# Patient Record
Sex: FEMALE | Race: BLACK OR AFRICAN AMERICAN | NOT HISPANIC OR LATINO | URBAN - METROPOLITAN AREA
[De-identification: names, ages, dates, MRNs, and addresses within clinical notes are randomized per-mention and may not be internally consistent; named-entity substitution may affect disease eponyms.]

---

## 2017-06-16 ENCOUNTER — INPATIENT (INPATIENT)
Facility: HOSPITAL | Age: 83
LOS: 1 days | Discharge: HOME | End: 2017-06-18
Attending: INTERNAL MEDICINE | Admitting: INTERNAL MEDICINE
Payer: MEDICARE

## 2017-06-16 VITALS
HEART RATE: 83 BPM | TEMPERATURE: 99 F | SYSTOLIC BLOOD PRESSURE: 151 MMHG | WEIGHT: 130.07 LBS | RESPIRATION RATE: 18 BRPM | HEIGHT: 67 IN | OXYGEN SATURATION: 97 % | DIASTOLIC BLOOD PRESSURE: 86 MMHG

## 2017-06-16 DIAGNOSIS — I10 ESSENTIAL (PRIMARY) HYPERTENSION: ICD-10-CM

## 2017-06-16 DIAGNOSIS — G30.9 ALZHEIMER'S DISEASE, UNSPECIFIED: ICD-10-CM

## 2017-06-16 DIAGNOSIS — T78.3XXA ANGIONEUROTIC EDEMA, INITIAL ENCOUNTER: ICD-10-CM

## 2017-06-16 DIAGNOSIS — E11.9 TYPE 2 DIABETES MELLITUS WITHOUT COMPLICATIONS: ICD-10-CM

## 2017-06-16 LAB
ALBUMIN SERPL ELPH-MCNC: 3.6 G/DL — SIGNIFICANT CHANGE UP (ref 3.3–5)
ALP SERPL-CCNC: 70 U/L — SIGNIFICANT CHANGE UP (ref 40–120)
ALT FLD-CCNC: 18 U/L — SIGNIFICANT CHANGE UP (ref 12–78)
ANION GAP SERPL CALC-SCNC: 9 MMOL/L — SIGNIFICANT CHANGE UP (ref 5–17)
APTT BLD: 31.3 SEC — SIGNIFICANT CHANGE UP (ref 27.5–37.4)
AST SERPL-CCNC: 23 U/L — SIGNIFICANT CHANGE UP (ref 15–37)
BASOPHILS # BLD AUTO: 0.1 K/UL — SIGNIFICANT CHANGE UP (ref 0–0.2)
BASOPHILS NFR BLD AUTO: 2 % — SIGNIFICANT CHANGE UP (ref 0–2)
BILIRUB SERPL-MCNC: 0.5 MG/DL — SIGNIFICANT CHANGE UP (ref 0.2–1.2)
BLD GP AB SCN SERPL QL: SIGNIFICANT CHANGE UP
BUN SERPL-MCNC: 9 MG/DL — SIGNIFICANT CHANGE UP (ref 7–23)
CALCIUM SERPL-MCNC: 9.2 MG/DL — SIGNIFICANT CHANGE UP (ref 8.5–10.1)
CHLORIDE SERPL-SCNC: 108 MMOL/L — SIGNIFICANT CHANGE UP (ref 96–108)
CO2 SERPL-SCNC: 27 MMOL/L — SIGNIFICANT CHANGE UP (ref 22–31)
CREAT SERPL-MCNC: 1.03 MG/DL — SIGNIFICANT CHANGE UP (ref 0.5–1.3)
EOSINOPHIL # BLD AUTO: 0.2 K/UL — SIGNIFICANT CHANGE UP (ref 0–0.5)
EOSINOPHIL NFR BLD AUTO: 3.5 % — SIGNIFICANT CHANGE UP (ref 0–6)
GLUCOSE SERPL-MCNC: 91 MG/DL — SIGNIFICANT CHANGE UP (ref 70–99)
HCT VFR BLD CALC: 39.8 % — SIGNIFICANT CHANGE UP (ref 34.5–45)
HGB BLD-MCNC: 13.6 G/DL — SIGNIFICANT CHANGE UP (ref 11.5–15.5)
INR BLD: 1.05 RATIO — SIGNIFICANT CHANGE UP (ref 0.88–1.16)
LYMPHOCYTES # BLD AUTO: 1.4 K/UL — SIGNIFICANT CHANGE UP (ref 1–3.3)
LYMPHOCYTES # BLD AUTO: 22.5 % — SIGNIFICANT CHANGE UP (ref 13–44)
MCHC RBC-ENTMCNC: 28.1 PG — SIGNIFICANT CHANGE UP (ref 27–34)
MCHC RBC-ENTMCNC: 34.1 GM/DL — SIGNIFICANT CHANGE UP (ref 32–36)
MCV RBC AUTO: 82.5 FL — SIGNIFICANT CHANGE UP (ref 80–100)
MONOCYTES # BLD AUTO: 0.5 K/UL — SIGNIFICANT CHANGE UP (ref 0–0.9)
MONOCYTES NFR BLD AUTO: 8.7 % — SIGNIFICANT CHANGE UP (ref 2–14)
NEUTROPHILS # BLD AUTO: 3.8 K/UL — SIGNIFICANT CHANGE UP (ref 1.8–7.4)
NEUTROPHILS NFR BLD AUTO: 63.3 % — SIGNIFICANT CHANGE UP (ref 43–77)
PLATELET # BLD AUTO: 217 K/UL — SIGNIFICANT CHANGE UP (ref 150–400)
POTASSIUM SERPL-MCNC: 4.1 MMOL/L — SIGNIFICANT CHANGE UP (ref 3.5–5.3)
POTASSIUM SERPL-SCNC: 4.1 MMOL/L — SIGNIFICANT CHANGE UP (ref 3.5–5.3)
PROT SERPL-MCNC: 7.7 GM/DL — SIGNIFICANT CHANGE UP (ref 6–8.3)
PROTHROM AB SERPL-ACNC: 11.5 SEC — SIGNIFICANT CHANGE UP (ref 9.8–12.7)
RBC # BLD: 4.82 M/UL — SIGNIFICANT CHANGE UP (ref 3.8–5.2)
RBC # FLD: 12.3 % — SIGNIFICANT CHANGE UP (ref 11–15)
SODIUM SERPL-SCNC: 144 MMOL/L — SIGNIFICANT CHANGE UP (ref 135–145)
WBC # BLD: 6.1 K/UL — SIGNIFICANT CHANGE UP (ref 3.8–10.5)
WBC # FLD AUTO: 6.1 K/UL — SIGNIFICANT CHANGE UP (ref 3.8–10.5)

## 2017-06-16 PROCEDURE — 99291 CRITICAL CARE FIRST HOUR: CPT

## 2017-06-16 RX ORDER — FAMOTIDINE 10 MG/ML
20 INJECTION INTRAVENOUS
Qty: 0 | Refills: 0 | Status: DISCONTINUED | OUTPATIENT
Start: 2017-06-16 | End: 2017-06-16

## 2017-06-16 RX ORDER — DIPHENHYDRAMINE HCL 50 MG
25 CAPSULE ORAL ONCE
Qty: 0 | Refills: 0 | Status: COMPLETED | OUTPATIENT
Start: 2017-06-16 | End: 2017-06-16

## 2017-06-16 RX ORDER — HALOPERIDOL DECANOATE 100 MG/ML
5 INJECTION INTRAMUSCULAR ONCE
Qty: 0 | Refills: 0 | Status: COMPLETED | OUTPATIENT
Start: 2017-06-16 | End: 2017-06-16

## 2017-06-16 RX ORDER — ENOXAPARIN SODIUM 100 MG/ML
40 INJECTION SUBCUTANEOUS EVERY 24 HOURS
Qty: 0 | Refills: 0 | Status: DISCONTINUED | OUTPATIENT
Start: 2017-06-16 | End: 2017-06-18

## 2017-06-16 RX ORDER — FAMOTIDINE 10 MG/ML
40 INJECTION INTRAVENOUS ONCE
Qty: 0 | Refills: 0 | Status: COMPLETED | OUTPATIENT
Start: 2017-06-16 | End: 2017-06-16

## 2017-06-16 RX ORDER — SODIUM CHLORIDE 9 MG/ML
1000 INJECTION INTRAMUSCULAR; INTRAVENOUS; SUBCUTANEOUS ONCE
Qty: 0 | Refills: 0 | Status: COMPLETED | OUTPATIENT
Start: 2017-06-16 | End: 2017-06-16

## 2017-06-16 RX ORDER — DIPHENHYDRAMINE HCL 50 MG
50 CAPSULE ORAL EVERY 12 HOURS
Qty: 0 | Refills: 0 | Status: DISCONTINUED | OUTPATIENT
Start: 2017-06-16 | End: 2017-06-18

## 2017-06-16 RX ORDER — FAMOTIDINE 10 MG/ML
20 INJECTION INTRAVENOUS DAILY
Qty: 0 | Refills: 0 | Status: DISCONTINUED | OUTPATIENT
Start: 2017-06-16 | End: 2017-06-18

## 2017-06-16 RX ADMIN — Medication 25 MILLIGRAM(S): at 08:30

## 2017-06-16 RX ADMIN — Medication 50 MILLIGRAM(S): at 18:23

## 2017-06-16 RX ADMIN — FAMOTIDINE 40 MILLIGRAM(S): 10 INJECTION INTRAVENOUS at 08:30

## 2017-06-16 RX ADMIN — HALOPERIDOL DECANOATE 5 MILLIGRAM(S): 100 INJECTION INTRAMUSCULAR at 19:44

## 2017-06-16 RX ADMIN — Medication 40 MILLIGRAM(S): at 18:23

## 2017-06-16 RX ADMIN — Medication 125 MILLIGRAM(S): at 08:30

## 2017-06-16 RX ADMIN — SODIUM CHLORIDE 1000 MILLILITER(S): 9 INJECTION INTRAMUSCULAR; INTRAVENOUS; SUBCUTANEOUS at 08:36

## 2017-06-16 RX ADMIN — ENOXAPARIN SODIUM 40 MILLIGRAM(S): 100 INJECTION SUBCUTANEOUS at 18:22

## 2017-06-16 RX ADMIN — Medication 40 MILLIGRAM(S): at 23:05

## 2017-06-16 NOTE — ED PROVIDER NOTE - MEDICAL DECISION MAKING DETAILS
Patient presents with angioedema.  progress of edema abated but now crossing midline.  d/w ICu and will admit. for observation. Andrew Kaur to evaluate.  VSS.  Uneventful ED observation period. Non toxic.

## 2017-06-16 NOTE — ED ADULT NURSE NOTE - OBJECTIVE STATEMENT
patient received, alert and oriented x4, came here for throat swelling and itchiness noticed by patient this morning when she woke up. patient noted with hoarse voice and throat clearing. no rash noted. no resp distress noted.

## 2017-06-16 NOTE — H&P ADULT - NSHPPHYSICALEXAM_GEN_ALL_CORE
PHYSICAL EXAM:    GENERAL: NAD, well-groomed, well-developed  HEAD:  Atraumatic, Normocephalic  EYES: EOMI, PERRLA, conjunctiva and sclera clear  ENMT: No tonsillar erythema, exudates, or enlargement; Moist mucous membranes,+ bilat lip swelling. tongue large Unable to see uvula phonation hoarse   NECK: Supple, No JVD, Normal thyroid  NERVOUS SYSTEM:  Alert & Oriented X3, Good concentration; Motor Strength 5/5 B/L upper and lower extremities; DTRs 2+ intact and symmetric  CHEST/LUNG: Clear to percussion bilaterally; No rales, rhonchi, wheezing, or rubs  HEART: Regular rate and rhythm; No murmurs, rubs, or gallops  ABDOMEN: Soft, Nontender, Nondistended; Bowel sounds present  EXTREMITIES:  2+ Peripheral Pulses, No clubbing, cyanosis, or edema  LYMPH: No lymphadenopathy noted  SKIN: No rashes or lesions GENERAL: NAD, well-groomed, well-developed  HEAD:  Atraumatic, Normocephalic  EYES: EOMI, PERRLA, conjunctiva and sclera clear  ENMT: No tonsillar erythema, exudates, or enlargement; Moist mucous membranes,+ bilat lip swelling. tongue large Unable to see uvula phonation hoarse   NECK: Supple, No JVD, Normal thyroid  NERVOUS SYSTEM:  Alert & Oriented X3, Good concentration; Motor Strength 5/5 B/L upper and lower extremities; DTRs 2+ intact and symmetric  CHEST/LUNG: Clear to percussion bilaterally; No rales, rhonchi, wheezing, or rubs  HEART: Regular rate and rhythm; No murmurs, rubs, or gallops  ABDOMEN: Soft, Nontender, Nondistended; Bowel sounds present  EXTREMITIES:  2+ Peripheral Pulses, No clubbing, cyanosis, or edema  LYMPH: No lymphadenopathy noted  SKIN: No rashes or lesions

## 2017-06-16 NOTE — ED PROVIDER NOTE - OBJECTIVE STATEMENT
Pertinent PMH/PSH/FHx/SHx and Review of Systems contained within:  Patient presents to the ED for lip and throat edema.  PMH of reported NSAID allergy but takes ASA daily.  PMH of HTN, HLD.  Patient with Sx from waking today.  lower lip and "feels like my throat is thick."  normal phonation.  no distress.  normal neck flexion.  comforatable supine in stretcher.  occurred today only.  took theraflu yesterday.  no skin/lung manifestations.  does take ACE-I for HTN.  ???similar occurrence years ago.  daughter had to stop ACE due to cough issues.  Non toxic.  Well appearing. No aggravating or relieving factors. No other pertinent PMH.  No other pertinent PSH.  No other pertinent FHx.  Patient denies EtOH/tobacco/illicit substance use. No fever/chills, No photophobia/eye pain/changes in vision, No ear pain/sore throat/dysphagia, No chest pain/palpitations, no SOB/cough/wheeze/stridor, No abdominal pain, No N/V/D, no dysuria/frequency/discharge, No neck/back pain, no rash, no changes in neurological status/function.

## 2017-06-16 NOTE — H&P ADULT - NSHPLABSRESULTS_GEN_ALL_CORE
CBC Full  -  ( 16 Jun 2017 08:44 )  WBC Count : 6.1 K/uL  Hemoglobin : 13.6 g/dL  Hematocrit : 39.8 %  Platelet Count - Automated : 217 K/uL  Mean Cell Volume : 82.5 fl  Mean Cell Hemoglobin : 28.1 pg  Mean Cell Hemoglobin Concentration : 34.1 gm/dL  Auto Neutrophil # : 3.8 K/uL  Auto Lymphocyte # : 1.4 K/uL  Auto Monocyte # : 0.5 K/uL  Auto Eosinophil # : 0.2 K/uL  Auto Basophil # : 0.1 K/uL  Auto Neutrophil % : 63.3 %  Auto Lymphocyte % : 22.5 %  Auto Monocyte % : 8.7 %  Auto Eosinophil % : 3.5 %  Auto Basophil % : 2.0 %      06-16    144  |  108  |  9   ----------------------------<  91  4.1   |  27  |  1.03    Ca    9.2      16 Jun 2017 08:44    TPro  7.7  /  Alb  3.6  /  TBili  0.5  /  DBili  x   /  AST  23  /  ALT  18  /  AlkPhos  70  06-16    LIVER FUNCTIONS - ( 16 Jun 2017 08:44 )  Alb: 3.6 g/dL / Pro: 7.7 gm/dL / ALK PHOS: 70 U/L / ALT: 18 U/L / AST: 23 U/L / GGT: x           CAPILLARY BLOOD GLUCOSE    PT/INR - ( 16 Jun 2017 08:44 )   PT: 11.5 sec;   INR: 1.05 ratio         PTT - ( 16 Jun 2017 08:44 )  PTT:31.3 sec    No chest x ray done will order  No ekg done - will order

## 2017-06-16 NOTE — ED PROVIDER NOTE - PHYSICAL EXAMINATION
Gen: Alert, NAD Head: NC, AT, PERRL, EOMI, normal lids/conjunctiva ENT:R>>L lower lip bullous edema crossing midline affecting ~75% of lip, mild right side upper lip edema ~25%, normal hearing, patent oropharynx without erythema/exudate, base of uvular visible and uvula midline Neck: +supple, no tenderness/meningismus/JVD, +Trachea midline Pulm: Bilateral BS, normal resp effort, no wheeze/stridor/retractions CV: RRR, no M/R/G, +dist pulses Abd: soft, NT/ND, +BS, no hepatosplenomegaly Mskel: no edema/erythema/cyanosis Skin: no rash Neuro: AAOx3, no sensory/motor deficits, CN 2-12 intact

## 2017-06-16 NOTE — ED PROVIDER NOTE - CRITICAL CARE PROVIDED
additional history taking/documentation/consultation with other physicians/interpretation of diagnostic studies/telephone consultation with the patient's family/direct patient care (not related to procedure)/consult w/ pt's family directly relating to pts condition

## 2017-06-16 NOTE — H&P ADULT - NSHPREVIEWOFSYSTEMS_GEN_ALL_CORE
CONSTITUTIONAL: No fever, weight loss, or fatigue  EYES: No eye pain, visual disturbances, or discharge  ENMT:  No difficulty hearing, tinnitus, vertigo; + throat pain  + lip swelling   NECK: No pain or stiffness  RESPIRATORY: No cough, wheezing, chills or hemoptysis; No shortness of breath  CARDIOVASCULAR: No chest pain, palpitations, dizziness, or leg swelling  GASTROINTESTINAL: No abdominal or epigastric pain. No nausea, vomiting, or hematemesis; No diarrhea or constipation. No melena or hematochezia.  GENITOURINARY: No dysuria, frequency, hematuria, or incontinence  NEUROLOGICAL: No headaches, memory loss, loss of strength, numbness, or tremors  SKIN: No itching, burning, rashes, or lesions   LYMPH NODES: No enlarged glands  ENDOCRINE: No heat or cold intolerance; No hair loss  MUSCULOSKELETAL: No joint pain or swelling; No muscle, back, or extremity pain  PSYCHIATRIC: No depression, anxiety, mood swings, or difficulty sleeping  HEME/LYMPH: No easy bruising, or bleeding gums  ALLERGY AND IMMUNOLOGIC: No hives or eczema

## 2017-06-16 NOTE — H&P ADULT - PROBLEM SELECTOR PLAN 1
Admit to ICU,  Steroids, H2 blocker, Benadryl ATC, if edema worsens FFP, ENT consult called Dr. Kaur to see patient. Chest x ray and EKG follow up labs

## 2017-06-16 NOTE — CONSULT NOTE ADULT - SUBJECTIVE AND OBJECTIVE BOX
HPI:  82 y/o  female presents to the ED for lip and throat edema.  PMH of reported NSAID allergy but takes ASA daily.  PMH of HTN, HLD, DM - .  Patient with Sx from waking today.  lower lip and "feels like my throat is thick."  normal phonation. No distress,  normal neck flexion, comfortable supine in stretcher. Patient symptoms statrted today Patient reported  took theraflu yesterday.  Patient reports does take ACE-I for HTN.  ??? similar occurrence years ago.  Daughter had to stop ACE due to cough issues. ICU called for evaluation, Patient seen and examined at bedside appears non toxic (16 Jun 2017 17:57). Howerr at 21:24 patient is confused. ./ dementia      PAST MEDICAL & SURGICAL HISTORY:  Alzheimer disease  DM (diabetes mellitus)  HTN (hypertension)  No significant past surgical history      REVIEW OF SYSTEMS:    CONSTITUTIONAL: No fever, weight loss, or fatigue  EYES: No eye pain, visual disturbances, or discharge  ENMT:  No difficulty hearing, tinnitus, vertigo; No sinus or throat pain  NECK: No pain or stiffness  BREASTS: No pain, masses, or nipple discharge  RESPIRATORY: No cough, wheezing, chills or hemoptysis; No shortness of breath  CARDIOVASCULAR: No chest pain, palpitations, dizziness, or leg swelling  GASTROINTESTINAL: No abdominal or epigastric pain. No nausea, vomiting, or hematemesis; No diarrhea or constipation. No melena or hematochezia.  GENITOURINARY: No dysuria, frequency, hematuria, or incontinence  NEUROLOGICAL: No headaches, memory loss, loss of strength, numbness, or tremors  SKIN: No itching, burning, rashes, or lesions   LYMPH NODES: No enlarged glands  ENDOCRINE: No heat or cold intolerance; No hair loss  MUSCULOSKELETAL: No joint pain or swelling; No muscle, back, or extremity pain  PSYCHIATRIC: No depression, anxiety, mood swings, or difficulty sleeping  HEME/LYMPH: No easy bruising, or bleeding gums  ALLERY AND IMMUNOLOGIC: No hives or eczema      MEDICATIONS  (STANDING):  enoxaparin Injectable 40milliGRAM(s) SubCutaneous every 24 hours  methylPREDNISolone sodium succinate Injectable 40milliGRAM(s) IV Push every 6 hours  diphenhydrAMINE   Injectable 50milliGRAM(s) IV Push every 12 hours  famotidine Injectable 20milliGRAM(s) IV Push daily    MEDICATIONS  (PRN):      Allergies    lisinopril (Swelling)    Intolerances        SOCIAL HISTORY:    FAMILY HISTORY:  No pertinent family history in first degree relatives      Vital Signs Last 24 Hrs  T(C): 36.4, Max: 37.6 (06-16 @ 09:10)  T(F): 97.6, Max: 99.6 (06-16 @ 09:10)  HR: 66 (0 - 83)  BP: 156/91 (147/90 - 174/90)  BP(mean): 107 (94 - 111)  RR: 15 (13 - 23)  SpO2: 90% (90% - 100%)    PHYSICAL EXAM:    GENERAL: NAD, well-groomed, well-developed  HEAD:  Atraumatic, Normocephalic. some lip swelling still present.  EYES: EOMI, PERRLA, conjunctiva and sclera clear  ENMT: No tonsillar erythema, exudates, or enlargement; Moist mucous membranes, Good dentition, No lesions  NECK: Supple, No JVD, Normal thyroid  NERVOUS SYSTEM:  confused,; Motor Strength 5/5 B/L upper and lower extremities; DTRs 2+ intact and symmetric  CHEST/LUNG: Clear to percussion bilaterally; No rales, rhonchi, wheezing, or rubs  HEART: Regular rate and rhythm; No murmurs, rubs, or gallops  ABDOMEN: Soft, Nontender, Nondistended; Bowel sounds present  EXTREMITIES:  2+ Peripheral Pulses, No clubbing, cyanosis, or edema  LYMPH: No lymphadenopathy notedRECTAL: No masses, prostate normal size and smooth, Guiac negative   BREAST: No palpable masses, skin no lesions no retractions     SKIN: No rashes or lesions      LABS:                        13.6   6.1   )-----------( 217      ( 16 Jun 2017 08:44 )             39.8     06-16    144  |  108  |  9   ----------------------------<  91  4.1   |  27  |  1.03    Ca    9.2      16 Jun 2017 08:44    TPro  7.7  /  Alb  3.6  /  TBili  0.5  /  DBili  x   /  AST  23  /  ALT  18  /  AlkPhos  70  06-16    PT/INR - ( 16 Jun 2017 08:44 )   PT: 11.5 sec;   INR: 1.05 ratio         PTT - ( 16 Jun 2017 08:44 )  PTT:31.3 sec        RADIOLOGY & ADDITIONAL STUDIES:

## 2017-06-16 NOTE — ED ADULT NURSE REASSESSMENT NOTE - NS ED NURSE REASSESS COMMENT FT1
FFP initiated at 0922 as per md to be stat, explained to patient and daughter to report any signs of reaction like chest pain, sob, itchiness, back pain, or of feeling something that is getting worse. patient verbalized understand. v/s documented on paper and flowsheet. will be monitoring patient for the first 15 mins for any transfusion reaction.
1 unit ffp finished and another unit was initiated, no transfusion reaction noted on patient. v/s taken and stable. patient verbalized understanding to note any signs of blood transfusion reaction like sob, chest pain, flank  pain, back pain, itchiness, difficulty breathing.
15 mins through patient's ffp transfusion, no reaction noted. v/s stable. patient put on 2L ns due to saturation going to 93%-95%
2nd unit of ffp done, v/s taken and stable. no reaction noted.
patient stated she feels better, still swelling on lower lips

## 2017-06-16 NOTE — H&P ADULT - ASSESSMENT
82 y/o  female presents to the ED for lip and throat edema.  PMH of reported NSAID allergy but takes ASA daily.  PMH of HTN, HLD, DM . Will admit for angioedema probably secondary to ACE as d/w Dr. Mixon

## 2017-06-16 NOTE — H&P ADULT - HISTORY OF PRESENT ILLNESS
84 y/o  female presents to the ED for lip and throat edema.  PMH of reported NSAID allergy but takes ASA daily.  PMH of HTN, HLD, DM - .  Patient with Sx from waking today.  lower lip and "feels like my throat is thick."  normal phonation. No distress,  normal neck flexion, comfortable supine in stretcher. Patient symptoms statrted today Patient reported  took theraflu yesterday.  Patient reports does take ACE-I for HTN.  ??? similar occurrence years ago.  Daughter had to stop ACE due to cough issues. ICU called for evaluation, Patient seen and examined at bedside appears non toxic

## 2017-06-17 LAB
ANION GAP SERPL CALC-SCNC: 8 MMOL/L — SIGNIFICANT CHANGE UP (ref 5–17)
BUN SERPL-MCNC: 13 MG/DL — SIGNIFICANT CHANGE UP (ref 7–23)
CALCIUM SERPL-MCNC: 8.7 MG/DL — SIGNIFICANT CHANGE UP (ref 8.5–10.1)
CHLORIDE SERPL-SCNC: 109 MMOL/L — HIGH (ref 96–108)
CO2 SERPL-SCNC: 25 MMOL/L — SIGNIFICANT CHANGE UP (ref 22–31)
CREAT SERPL-MCNC: 0.83 MG/DL — SIGNIFICANT CHANGE UP (ref 0.5–1.3)
GLUCOSE SERPL-MCNC: 127 MG/DL — HIGH (ref 70–99)
HBA1C BLD-MCNC: 5.9 % — HIGH (ref 4–5.6)
HCT VFR BLD CALC: 34 % — LOW (ref 34.5–45)
HGB BLD-MCNC: 11.7 G/DL — SIGNIFICANT CHANGE UP (ref 11.5–15.5)
MAGNESIUM SERPL-MCNC: 2.2 MG/DL — SIGNIFICANT CHANGE UP (ref 1.6–2.6)
MCHC RBC-ENTMCNC: 27.6 PG — SIGNIFICANT CHANGE UP (ref 27–34)
MCHC RBC-ENTMCNC: 34.4 GM/DL — SIGNIFICANT CHANGE UP (ref 32–36)
MCV RBC AUTO: 80.4 FL — SIGNIFICANT CHANGE UP (ref 80–100)
PHOSPHATE SERPL-MCNC: 3.2 MG/DL — SIGNIFICANT CHANGE UP (ref 2.5–4.5)
PLATELET # BLD AUTO: 195 K/UL — SIGNIFICANT CHANGE UP (ref 150–400)
POTASSIUM SERPL-MCNC: 3.8 MMOL/L — SIGNIFICANT CHANGE UP (ref 3.5–5.3)
POTASSIUM SERPL-SCNC: 3.8 MMOL/L — SIGNIFICANT CHANGE UP (ref 3.5–5.3)
RBC # BLD: 4.23 M/UL — SIGNIFICANT CHANGE UP (ref 3.8–5.2)
RBC # FLD: 12.5 % — SIGNIFICANT CHANGE UP (ref 11–15)
SODIUM SERPL-SCNC: 142 MMOL/L — SIGNIFICANT CHANGE UP (ref 135–145)
WBC # BLD: 6.1 K/UL — SIGNIFICANT CHANGE UP (ref 3.8–10.5)
WBC # FLD AUTO: 6.1 K/UL — SIGNIFICANT CHANGE UP (ref 3.8–10.5)

## 2017-06-17 RX ORDER — AMLODIPINE BESYLATE 2.5 MG/1
10 TABLET ORAL DAILY
Qty: 0 | Refills: 0 | Status: DISCONTINUED | OUTPATIENT
Start: 2017-06-18 | End: 2017-06-18

## 2017-06-17 RX ORDER — AMLODIPINE BESYLATE 2.5 MG/1
5 TABLET ORAL ONCE
Qty: 0 | Refills: 0 | Status: COMPLETED | OUTPATIENT
Start: 2017-06-17 | End: 2017-06-17

## 2017-06-17 RX ORDER — HALOPERIDOL DECANOATE 100 MG/ML
5 INJECTION INTRAMUSCULAR ONCE
Qty: 0 | Refills: 0 | Status: DISCONTINUED | OUTPATIENT
Start: 2017-06-17 | End: 2017-06-17

## 2017-06-17 RX ORDER — HALOPERIDOL DECANOATE 100 MG/ML
5 INJECTION INTRAMUSCULAR ONCE
Qty: 0 | Refills: 0 | Status: COMPLETED | OUTPATIENT
Start: 2017-06-17 | End: 2017-06-17

## 2017-06-17 RX ORDER — AMLODIPINE BESYLATE 2.5 MG/1
5 TABLET ORAL DAILY
Qty: 0 | Refills: 0 | Status: DISCONTINUED | OUTPATIENT
Start: 2017-06-17 | End: 2017-06-17

## 2017-06-17 RX ADMIN — HALOPERIDOL DECANOATE 5 MILLIGRAM(S): 100 INJECTION INTRAMUSCULAR at 05:15

## 2017-06-17 RX ADMIN — Medication 40 MILLIGRAM(S): at 13:05

## 2017-06-17 RX ADMIN — AMLODIPINE BESYLATE 5 MILLIGRAM(S): 2.5 TABLET ORAL at 17:18

## 2017-06-17 RX ADMIN — Medication 40 MILLIGRAM(S): at 17:17

## 2017-06-17 RX ADMIN — Medication 40 MILLIGRAM(S): at 05:12

## 2017-06-17 RX ADMIN — Medication 50 MILLIGRAM(S): at 05:10

## 2017-06-17 RX ADMIN — ENOXAPARIN SODIUM 40 MILLIGRAM(S): 100 INJECTION SUBCUTANEOUS at 18:50

## 2017-06-17 RX ADMIN — AMLODIPINE BESYLATE 5 MILLIGRAM(S): 2.5 TABLET ORAL at 13:03

## 2017-06-17 RX ADMIN — FAMOTIDINE 20 MILLIGRAM(S): 10 INJECTION INTRAVENOUS at 13:28

## 2017-06-17 RX ADMIN — Medication 50 MILLIGRAM(S): at 17:18

## 2017-06-17 NOTE — PROGRESS NOTE ADULT - SUBJECTIVE AND OBJECTIVE BOX
HPI:  83F PMH of HTN on lisinipril, HLD, DM, Alzheimer dementia presents to ER 6/16 with complaints of lip swelling, chronic cough, voice hoarseness. Admitted to ICU for airway monitoring due to angioedema.      24 hr events:  remains on solumedrol, benadryl, pepcid for angioedema  lip swelling resolved  overnight pt confused, combative, climbing out of bed - required haldol for sedation    ## ROS:  CONSTITUTIONAL: No fever, fatigue  EYES: No visual disturbances  ENMT: No throat pain, no drooling, no trouble managing oral secretions, no lip numbness  NECK: No pain or stiffness  RESPIRATORY: No shortness of breath  CARDIOVASCULAR: No chest pain  GASTROINTESTINAL: No abdominal pain. No nausea, vomiting  NEUROLOGICAL: No headaches  MUSCULOSKELETAL: No joint pain or swelling; No muscle, back, or extremity pain    ## Labs:  CBC:                        11.7   6.1   )-----------( 195      ( 17 Jun 2017 03:34 )             34.0     Chem:  06-17    142  |  109<H>  |  13  ----------------------------<  127<H>  3.8   |  25  |  0.83    Ca    8.7      17 Jun 2017 03:34  Phos  3.2     06-17  Mg     2.2     06-17    TPro  7.7  /  Alb  3.6  /  TBili  0.5  /  AST  23  /  ALT  18  /  AlkPhos  70  06-16    Coags:  PT/INR - ( 16 Jun 2017 08:44 )   PT: 11.5 sec;   INR: 1.05 ratio       PTT - ( 16 Jun 2017 08:44 )  PTT:31.3 sec        ## Imaging:  none    ## Medications:  amLODIPine   Tablet 5milliGRAM(s) Oral daily    diphenhydrAMINE   Injectable 50milliGRAM(s) IV Push every 12 hours    methylPREDNISolone sodium succinate Injectable 40milliGRAM(s) IV Push every 6 hours    enoxaparin Injectable 40milliGRAM(s) SubCutaneous every 24 hours    famotidine Injectable 20milliGRAM(s) IV Push daily        ## Vitals:  T(C): 36.3, Max: 36.4 (06-16 @ 20:00)  HR: 66 (0 - 82)  BP: 177/75 (120/84 - 179/73)  BP(mean): 103 (79 - 140)  RR: 23 (9 - 27)  SpO2: 98% (83% - 99%)      I & Os for current day (as of 06-17 @ 13:16)  =============================================  IN: 0 ml / OUT: 400 ml / NET: -400 ml        ## P/E:  Gen: lying comfortably in bed, no apparent distress  HEENT: PERRL, EOMI, no lip swelling, no tongue swelling, no stridor  Resp: CTA B/L, no wheeze/rhonchi  CVS: S1S2 RRR  Abd: soft NT/ND +BS  Ext: no c/c/e  Neuro: A&Ox2    CENTRAL LINE: [ ] YES [x] NO    MOODY: [ ] YES [x] NO      A-LINE:  [ ] YES [x] NO      GLOBAL ISSUE/BEST PRACTICE:  Analgesia: n/a  Sedation: n/a  HOB elevation: yes  Stress ulcer prophylaxis: pepcid  VTE prophylaxis: lovenox  Oral Care: n/a  Glycemic control: n/a  Nutrition: full liquid diet, advance as tolerates    CODE STATUS: [x] full code  [ ] DNR  [ ] DNI  [ ] MOLST  Goals of care discussion: [x] yes

## 2017-06-17 NOTE — CONSULT NOTE ADULT - SUBJECTIVE AND OBJECTIVE BOX
HPI:  82 y/o  female presents to the ED for lip and throat edema.  PMH of reported NSAID allergy but takes ASA daily.  PMH of HTN, HLD, DM - .  Patient with Sx from waking today.  lower lip and "feels like my throat is thick."  normal phonation. No distress,  normal neck flexion, comfortable supine in stretcher. Patient symptoms statrted today Patient reported  took theraflu yesterday.  Patient reports does take ACE-I for HTN.  ??? similar occurrence years ago.  Daughter had to stop ACE due to cough issues. currently feels much better, denies noisy breathing or difficulty breathing. +complaints of difficulty swallowing.      PAST MEDICAL & SURGICAL HISTORY:  Alzheimer disease  DM (diabetes mellitus)  HTN (hypertension)  No significant past surgical history      REVIEW OF SYSTEMS:    CONSTITUTIONAL: No fever, weight loss, or fatigue  EYES: No eye pain, visual disturbances, or discharge  ENMT:  No difficulty hearing, tinnitus, vertigo; No sinus or throat pain  NECK: No pain or stiffness  BREASTS: No pain, masses, or nipple discharge  RESPIRATORY: No cough, wheezing, chills or hemoptysis; No shortness of breath  CARDIOVASCULAR: No chest pain, palpitations, dizziness, or leg swelling  GASTROINTESTINAL: No abdominal or epigastric pain. No nausea, vomiting, or hematemesis; No diarrhea or constipation. No melena or hematochezia.  GENITOURINARY: No dysuria, frequency, hematuria, or incontinence  NEUROLOGICAL: No headaches, memory loss, loss of strength, numbness, or tremors  SKIN: No itching, burning, rashes, or lesions   LYMPH NODES: No enlarged glands  ENDOCRINE: No heat or cold intolerance; No hair loss  MUSCULOSKELETAL: No joint pain or swelling; No muscle, back, or extremity pain  PSYCHIATRIC: No depression, anxiety, mood swings, or difficulty sleeping  HEME/LYMPH: No easy bruising, or bleeding gums  ALLERY AND IMMUNOLOGIC: No hives or eczema      MEDICATIONS  (STANDING):  enoxaparin Injectable 40milliGRAM(s) SubCutaneous every 24 hours  methylPREDNISolone sodium succinate Injectable 40milliGRAM(s) IV Push every 6 hours  diphenhydrAMINE   Injectable 50milliGRAM(s) IV Push every 12 hours  famotidine Injectable 20milliGRAM(s) IV Push daily    MEDICATIONS  (PRN):      Allergies    lisinopril (Swelling)    Intolerances        SOCIAL HISTORY:    FAMILY HISTORY:  No pertinent family history in first degree relatives      Vital Signs Last 24 Hrs  T(C): 36.4, Max: 37.6 (06-16 @ 09:10)  T(F): 97.6, Max: 99.6 (06-16 @ 09:10)  HR: 66 (0 - 83)  BP: 156/91 (147/90 - 174/90)  BP(mean): 107 (94 - 111)  RR: 15 (13 - 23)  SpO2: 90% (90% - 100%)    PHYSICAL EXAM:    GENERAL: NAD, well-groomed, well-developed  HEAD:  Atraumatic, Normocephalic. some lip swelling still present.  EYES: EOMI, PERRLA, conjunctiva and sclera clear  OC/OP: fom/bot soft, uvula midline and nonedematous, 1+ tonsils b/l  FACE: decreased edema of lips, HB I/VI bilaterally  NECK: Supple, No JVD, Normal thyroid  NERVOUS SYSTEM:  confused,; Motor Strength 5/5 B/L upper and lower extremities; DTRs 2+ intact and symmetric  CHEST/LUNG: Clear to percussion bilaterally; No rales, rhonchi, wheezing, or rubs  HEART: Regular rate and rhythm; No murmurs, rubs, or gallops  ABDOMEN: Soft, Nontender, Nondistended; Bowel sounds present  EXTREMITIES:  2+ Peripheral Pulses, No clubbing, cyanosis, or edema  LYMPH: No lymphadenopathy notedRECTAL: No masses, prostate normal size and smooth, Guiac negative   BREAST: No palpable masses, skin no lesions no retractions     Fiberoptic Laryngoscopy: clear nasopharynx to glottis, true vocal cords mobile bilaterally, airway patent      LABS:                        13.6   6.1   )-----------( 217      ( 16 Jun 2017 08:44 )             39.8     06-16    144  |  108  |  9   ----------------------------<  91  4.1   |  27  |  1.03    Ca    9.2      16 Jun 2017 08:44    TPro  7.7  /  Alb  3.6  /  TBili  0.5  /  DBili  x   /  AST  23  /  ALT  18  /  AlkPhos  70  06-16    PT/INR - ( 16 Jun 2017 08:44 )   PT: 11.5 sec;   INR: 1.05 ratio         PTT - ( 16 Jun 2017 08:44 )  PTT:31.3 sec        RADIOLOGY & ADDITIONAL STUDIES:    Assessment: Angioedema, much improved. Airway widely patent  		      Problem/Recommendation   1) ok to taper off steroids  2) adat  3) care per primary team

## 2017-06-17 NOTE — PROGRESS NOTE ADULT - ASSESSMENT
83F PMH of HTN on lisinipril, HLD, DM, Alzheimer dementia presents to ER 6/16 with complaints of lip swelling, chronic cough, voice hoarseness. Admitted to ICU for angioedema likely due to ACE-I and for airway monitoring.    ENT  - lip swelling significantly improved  - protecting airway  - appreciate ENT eval: airway patent  - day #2 of h2 blocker, antihistamine, and steroids (pepcid, benadryl, solumedrol)  - likely can change to oral prednisone in next 24 hours    CV  - HTN: ACE-I d/raquel due to angioedema  - start amlodipine for BP control of HTN    NEURO  - underlying dementia from Alz: required haldol overnight for delirium agitation  - on enhanced supervision as pt frequently climbing out of bed    GEN  - stable for transfer to medical floor  - if remains stable likely can be d/c home soon

## 2017-06-17 NOTE — PROGRESS NOTE ADULT - SUBJECTIVE AND OBJECTIVE BOX
Patient is a 83y old  Female who presents with a chief complaint of The patient is a 83y Female complaining of allergic reaction. (16 Jun 2017 17:57)      INTERVAL HPI/OVERNIGHT EVENTS: improvement in angioedema, no swelling of tongue. able to swallow.    MEDICATIONS  (STANDING):  enoxaparin Injectable 40milliGRAM(s) SubCutaneous every 24 hours  methylPREDNISolone sodium succinate Injectable 40milliGRAM(s) IV Push every 6 hours  diphenhydrAMINE   Injectable 50milliGRAM(s) IV Push every 12 hours  famotidine Injectable 20milliGRAM(s) IV Push daily    MEDICATIONS  (PRN):      Allergies    lisinopril (Swelling)    Intolerances        REVIEW OF SYSTEMS:  CONSTITUTIONAL: No fever, weight loss, or fatigue  EYES: No eye pain, visual disturbances, or discharge  ENMT:  No difficulty hearing, tinnitus, vertigo; No sinus or throat pain  NECK: No pain or stiffness  BREASTS: No pain, masses, or nipple discharge  RESPIRATORY: No cough, wheezing, chills or hemoptysis; No shortness of breath  CARDIOVASCULAR: No chest pain, palpitations, dizziness, or leg swelling  GASTROINTESTINAL: No abdominal or epigastric pain. No nausea, vomiting, or hematemesis; No diarrhea or constipation. No melena or hematochezia.  GENITOURINARY: No dysuria, frequency, hematuria, or incontinence  NEUROLOGICAL: No headaches, memory loss, loss of strength, numbness, or tremors  SKIN: No itching, burning, rashes, or lesions   LYMPH NODES: No enlarged glands  ENDOCRINE: No heat or cold intolerance; No hair loss  MUSCULOSKELETAL: No joint pain or swelling; No muscle, back, or extremity pain  PSYCHIATRIC: No depression, anxiety, mood swings, or difficulty sleeping  HEME/LYMPH: No easy bruising, or bleeding gums  ALLERY AND IMMUNOLOGIC: No hives or eczema    Vital Signs Last 24 Hrs  T(C): 36.2, Max: 37 (06-16 @ 10:46)  T(F): 97.2, Max: 98.6 (06-16 @ 10:46)  HR: 59 (0 - 82)  BP: 167/71 (120/84 - 179/73)  BP(mean): 97 (79 - 115)  RR: 17 (9 - 27)  SpO2: 99% (83% - 99%)    PHYSICAL EXAM:  GENERAL: NAD, well-groomed, well-developed  HEAD:  Atraumatic, Normocephalic  EYES: EOMI, PERRLA, conjunctiva and sclera clear  ENMT: No tonsillar erythema, exudates, or enlargement; Moist mucous membranes, Good dentition, No lesions. No more swelling of lips or tongue.   NECK: Supple, No JVD, Normal thyroid  NERVOUS SYSTEM:  alert, demented; Motor Strength 5/5 B/L upper and lower extremities; DTRs 2+ intact and symmetric  CHEST/LUNG: Clear to percussion bilaterally; No rales, rhonchi, wheezing, or rubs  HEART: Regular rate and rhythm; No murmurs, rubs, or gallops  ABDOMEN: Soft, Nontender, Nondistended; Bowel sounds present  EXTREMITIES:  2+ Peripheral Pulses, No clubbing, cyanosis, or edema  LYMPH: No lymphadenopathy noted  SKIN: No rashes or lesions    LABS:                        11.7   6.1   )-----------( 195      ( 17 Jun 2017 03:34 )             34.0     06-17    142  |  109<H>  |  13  ----------------------------<  127<H>  3.8   |  25  |  0.83    Ca    8.7      17 Jun 2017 03:34  Phos  3.2     06-17  Mg     2.2     06-17    TPro  7.7  /  Alb  3.6  /  TBili  0.5  /  DBili  x   /  AST  23  /  ALT  18  /  AlkPhos  70  06-16      PT/INR - ( 16 Jun 2017 08:44 )   PT: 11.5 sec;   INR: 1.05 ratio         PTT - ( 16 Jun 2017 08:44 )  PTT:31.3 sec    CAPILLARY BLOOD GLUCOSE  134 (17 Jun 2017 05:32)  142 (16 Jun 2017 23:04)                RADIOLOGY & ADDITIONAL TESTS:    Imaging Personally Reviewed:  [ ] YES  [ ] NO    Consultant(s) Notes Reviewed:  [ ] YES  [ ] NO    Care Discussed with Consultants/Other Providers [ x] YES  [ ] NO    Care discussed with family,         [  ]   yes  [  ]  No    imp:    stable[ ]    unstable[  ]     improving [ x  ]       unchanged  [  ]                Plans:  Continue present plans  [x  ] as per Critical care. Stable for discharge               New consult [  ]   specialty  .......               order test[  ]    test name.                  Discharge Planning  [  ]

## 2017-06-18 VITALS — RESPIRATION RATE: 16 BRPM | SYSTOLIC BLOOD PRESSURE: 173 MMHG | DIASTOLIC BLOOD PRESSURE: 90 MMHG

## 2017-06-18 LAB
ANION GAP SERPL CALC-SCNC: 10 MMOL/L — SIGNIFICANT CHANGE UP (ref 5–17)
BUN SERPL-MCNC: 18 MG/DL — SIGNIFICANT CHANGE UP (ref 7–23)
CALCIUM SERPL-MCNC: 9.3 MG/DL — SIGNIFICANT CHANGE UP (ref 8.5–10.1)
CHLORIDE SERPL-SCNC: 108 MMOL/L — SIGNIFICANT CHANGE UP (ref 96–108)
CO2 SERPL-SCNC: 26 MMOL/L — SIGNIFICANT CHANGE UP (ref 22–31)
CREAT SERPL-MCNC: 0.95 MG/DL — SIGNIFICANT CHANGE UP (ref 0.5–1.3)
GLUCOSE SERPL-MCNC: 110 MG/DL — HIGH (ref 70–99)
HCT VFR BLD CALC: 36.2 % — SIGNIFICANT CHANGE UP (ref 34.5–45)
HGB BLD-MCNC: 12.8 G/DL — SIGNIFICANT CHANGE UP (ref 11.5–15.5)
MAGNESIUM SERPL-MCNC: 2.5 MG/DL — SIGNIFICANT CHANGE UP (ref 1.6–2.6)
MCHC RBC-ENTMCNC: 28.6 PG — SIGNIFICANT CHANGE UP (ref 27–34)
MCHC RBC-ENTMCNC: 35.4 GM/DL — SIGNIFICANT CHANGE UP (ref 32–36)
MCV RBC AUTO: 80.9 FL — SIGNIFICANT CHANGE UP (ref 80–100)
PHOSPHATE SERPL-MCNC: 2.9 MG/DL — SIGNIFICANT CHANGE UP (ref 2.5–4.5)
PLATELET # BLD AUTO: 236 K/UL — SIGNIFICANT CHANGE UP (ref 150–400)
POTASSIUM SERPL-MCNC: 3.7 MMOL/L — SIGNIFICANT CHANGE UP (ref 3.5–5.3)
POTASSIUM SERPL-SCNC: 3.7 MMOL/L — SIGNIFICANT CHANGE UP (ref 3.5–5.3)
RBC # BLD: 4.48 M/UL — SIGNIFICANT CHANGE UP (ref 3.8–5.2)
RBC # FLD: 12.2 % — SIGNIFICANT CHANGE UP (ref 11–15)
SODIUM SERPL-SCNC: 144 MMOL/L — SIGNIFICANT CHANGE UP (ref 135–145)
WBC # BLD: 8.4 K/UL — SIGNIFICANT CHANGE UP (ref 3.8–10.5)
WBC # FLD AUTO: 8.4 K/UL — SIGNIFICANT CHANGE UP (ref 3.8–10.5)

## 2017-06-18 RX ORDER — AMLODIPINE BESYLATE 2.5 MG/1
1 TABLET ORAL
Qty: 0 | Refills: 0 | COMMUNITY
Start: 2017-06-18

## 2017-06-18 RX ORDER — HALOPERIDOL DECANOATE 100 MG/ML
2 INJECTION INTRAMUSCULAR ONCE
Qty: 0 | Refills: 0 | Status: COMPLETED | OUTPATIENT
Start: 2017-06-18 | End: 2017-06-18

## 2017-06-18 RX ADMIN — HALOPERIDOL DECANOATE 2 MILLIGRAM(S): 100 INJECTION INTRAMUSCULAR at 01:06

## 2017-06-18 NOTE — DISCHARGE NOTE ADULT - NSTOBACCOHOTLINE_GEN_A_CS
[facility] Smokers Quitline ([id number]) [facility] Smokers Quitline ([id number])

## 2017-06-18 NOTE — DISCHARGE NOTE ADULT - MEDICATION SUMMARY - MEDICATIONS TO CHANGE
I will SWITCH the dose or number of times a day I take the medications listed below when I get home from the hospital:  None PRESSURED/clear

## 2017-06-18 NOTE — DISCHARGE NOTE ADULT - PATIENT PORTAL LINK FT
“You can access the FollowHealth Patient Portal, offered by [facility], by registering with the following website: [email or web address]”

## 2017-06-18 NOTE — PROGRESS NOTE ADULT - SUBJECTIVE AND OBJECTIVE BOX
Patient is a 83y old  Female who presents with a chief complaint of The patient is a 83y Female complaining of allergic reaction. (18 Jun 2017 09:52)  No more lip or tongue swelling    INTERVAL HPI/OVERNIGHT EVENTS:uneventful.     MEDICATIONS  (STANDING):  enoxaparin Injectable 40milliGRAM(s) SubCutaneous every 24 hours  amLODIPine   Tablet 10milliGRAM(s) Oral daily    MEDICATIONS  (PRN):      Allergies    lisinopril (Swelling)    Intolerances        REVIEW OF SYSTEMS:  CONSTITUTIONAL: No fever, weight loss, or fatigue  EYES: No eye pain, visual disturbances, or discharge  ENMT:  No difficulty hearing, tinnitus, vertigo; No sinus or throat pain  NECK: No pain or stiffness  BREASTS: No pain, masses, or nipple discharge  RESPIRATORY: No cough, wheezing, chills or hemoptysis; No shortness of breath  CARDIOVASCULAR: No chest pain, palpitations, dizziness, or leg swelling  GASTROINTESTINAL: No abdominal or epigastric pain. No nausea, vomiting, or hematemesis; No diarrhea or constipation. No melena or hematochezia.  GENITOURINARY: No dysuria, frequency, hematuria, or incontinence  NEUROLOGICAL: No headaches, memory loss, loss of strength, numbness, or tremors  SKIN: No itching, burning, rashes, or lesions   LYMPH NODES: No enlarged glands  ENDOCRINE: No heat or cold intolerance; No hair loss  MUSCULOSKELETAL: No joint pain or swelling; No muscle, back, or extremity pain  PSYCHIATRIC: No depression, anxiety, mood swings, or difficulty sleeping  HEME/LYMPH: No easy bruising, or bleeding gums  ALLERY AND IMMUNOLOGIC: No hives or eczema    Vital Signs Last 24 Hrs  T(C): 37.1, Max: 37.2 (06-17 @ 15:12)  T(F): 98.8, Max: 99 (06-17 @ 15:12)  HR: 80 (66 - 83)  BP: 173/90 (152/78 - 190/101)  BP(mean): 110 (72 - 140)  RR: 16 (16 - 23)  SpO2: 85% (85% - 100%)    PHYSICAL EXAM:  GENERAL: NAD, well-groomed, well-developed  HEAD:  Atraumatic, Normocephalic  EYES: EOMI, PERRLA, conjunctiva and sclera clear  ENMT: No tonsillar erythema, exudates, or enlargement; Moist mucous membranes, Good dentition, No lesions  NECK: Supple, No JVD, Normal thyroid  NERVOUS SYSTEM:  Alert & Oriented X3, Good concentration; Motor Strength 5/5 B/L upper and lower extremities; DTRs 2+ intact and symmetric  CHEST/LUNG: Clear to percussion bilaterally; No rales, rhonchi, wheezing, or rubs  HEART: Regular rate and rhythm; No murmurs, rubs, or gallops  ABDOMEN: Soft, Nontender, Nondistended; Bowel sounds present  EXTREMITIES:  2+ Peripheral Pulses, No clubbing, cyanosis, or edema  LYMPH: No lymphadenopathy noted  SKIN: No rashes or lesions    LABS:                        12.8   8.4   )-----------( 236      ( 18 Jun 2017 04:06 )             36.2     06-18    144  |  108  |  18  ----------------------------<  110<H>  3.7   |  26  |  0.95    Ca    9.3      18 Jun 2017 04:06  Phos  2.9     06-18  Mg     2.5     06-18            CAPILLARY BLOOD GLUCOSE  94 (18 Jun 2017 08:07)  120 (17 Jun 2017 21:43)  93 (17 Jun 2017 17:27)  122 (17 Jun 2017 13:14)          Hemoglobin A1C, Whole Blood: 5.9 % (06-17 @ 10:11)        RADIOLOGY & ADDITIONAL TESTS:    Imaging Personally Reviewed:  [ ] YES  [ ] NO    Consultant(s) Notes Reviewed:  [ ] YES  [ ] NO    Care Discussed with Consultants/Other Providers [ ] YES  [ ] NO    Care discussed with family,         [  ]   yes  [  ]  No    imp:    stable[ x]    unstable[  ]     improving [   ]       unchanged  [  ]                Plans:  Continue present plans  [ x ]               New consult [  ]   specialty  .......               order test[  ]    test name.                  Discharge Planning  [  ]

## 2017-06-18 NOTE — DISCHARGE NOTE ADULT - CARE PLAN
Principal Discharge DX:	Angioedema, initial encounter  Goal:	DO NOT TAKE ANT ACE INHIBITORS NO FURTHER LISINOPRIL  Instructions for follow-up, activity and diet:	follow up with you PMD  Secondary Diagnosis:	HTN (hypertension)  Goal:	maintain SBP < 130  Instructions for follow-up, activity and diet:	follow up with you PMD

## 2017-06-18 NOTE — DISCHARGE NOTE ADULT - HOSPITAL COURSE
83F PMH of HTN on lisinipril, HLD, DM, Alzheimer dementia presents to ER 6/16 with complaints of lip swelling, chronic cough, voice hoarseness. Admitted to ICU for airway monitoring due to angioedema. Seen by ENT Angioedema, much improved. Airway widely patent. Will change steroids to PO for short 3 day course. HTN no further ACE inhibitors therefore will start Norvasc PO to be followed up with PMD. 83F PMH of HTN on lisinipril, HLD, DM, Alzheimer dementia presents to ER 6/16 with complaints of lip swelling, chronic cough, voice hoarseness. Admitted to ICU for airway monitoring due to angioedema. Treated with pepcid, benadryl, solumedrol. Seen by ENT Angioedema, much improved. Airway widely patent. Will change steroids to PO for a total of  3 day course. HTN: no further ACE inhibitors therefore will start Norvasc PO for BP control. To be followed up with PMD. Stable for discharge. Maintaining airway. Tolerating po diet. Family to pick pt up for discharge home.

## 2017-06-18 NOTE — DISCHARGE NOTE ADULT - MEDICATION SUMMARY - MEDICATIONS TO TAKE
I will START or STAY ON the medications listed below when I get home from the hospital:    Aspir 81 oral delayed release tablet  -- 1 tab(s) by mouth once a day  -- Indication: For HTN (hypertension)    amLODIPine 10 mg oral tablet  -- 1 tab(s) by mouth once a day  -- Indication: For HTN (hypertension)

## 2017-06-20 DIAGNOSIS — E78.5 HYPERLIPIDEMIA, UNSPECIFIED: ICD-10-CM

## 2017-06-20 DIAGNOSIS — F02.80 DEMENTIA IN OTHER DISEASES CLASSIFIED ELSEWHERE, UNSPECIFIED SEVERITY, WITHOUT BEHAVIORAL DISTURBANCE, PSYCHOTIC DISTURBANCE, MOOD DISTURBANCE, AND ANXIETY: ICD-10-CM

## 2017-06-20 DIAGNOSIS — T78.3XXA ANGIONEUROTIC EDEMA, INITIAL ENCOUNTER: ICD-10-CM

## 2017-06-20 DIAGNOSIS — Z79.82 LONG TERM (CURRENT) USE OF ASPIRIN: ICD-10-CM

## 2017-06-20 DIAGNOSIS — I10 ESSENTIAL (PRIMARY) HYPERTENSION: ICD-10-CM

## 2017-06-20 DIAGNOSIS — E11.9 TYPE 2 DIABETES MELLITUS WITHOUT COMPLICATIONS: ICD-10-CM

## 2017-06-20 DIAGNOSIS — G30.9 ALZHEIMER'S DISEASE, UNSPECIFIED: ICD-10-CM

## 2017-08-14 ENCOUNTER — INPATIENT (INPATIENT)
Facility: HOSPITAL | Age: 83
LOS: 0 days | Discharge: HOME | End: 2017-08-15
Attending: INTERNAL MEDICINE | Admitting: INTERNAL MEDICINE
Payer: COMMERCIAL

## 2017-08-14 VITALS
SYSTOLIC BLOOD PRESSURE: 135 MMHG | DIASTOLIC BLOOD PRESSURE: 79 MMHG | OXYGEN SATURATION: 98 % | WEIGHT: 136.03 LBS | HEIGHT: 66 IN | TEMPERATURE: 98 F | HEART RATE: 62 BPM

## 2017-08-14 DIAGNOSIS — R55 SYNCOPE AND COLLAPSE: ICD-10-CM

## 2017-08-14 DIAGNOSIS — I10 ESSENTIAL (PRIMARY) HYPERTENSION: ICD-10-CM

## 2017-08-14 DIAGNOSIS — Z87.898 PERSONAL HISTORY OF OTHER SPECIFIED CONDITIONS: ICD-10-CM

## 2017-08-14 DIAGNOSIS — E86.9 VOLUME DEPLETION, UNSPECIFIED: ICD-10-CM

## 2017-08-14 LAB
ALBUMIN SERPL ELPH-MCNC: 3.6 G/DL — SIGNIFICANT CHANGE UP (ref 3.3–5)
ALP SERPL-CCNC: 62 U/L — SIGNIFICANT CHANGE UP (ref 40–120)
ALT FLD-CCNC: 24 U/L — SIGNIFICANT CHANGE UP (ref 12–78)
ANION GAP SERPL CALC-SCNC: 8 MMOL/L — SIGNIFICANT CHANGE UP (ref 5–17)
APPEARANCE UR: CLEAR — SIGNIFICANT CHANGE UP
AST SERPL-CCNC: 31 U/L — SIGNIFICANT CHANGE UP (ref 15–37)
BASOPHILS # BLD AUTO: 0.1 K/UL — SIGNIFICANT CHANGE UP (ref 0–0.2)
BASOPHILS NFR BLD AUTO: 3.3 % — HIGH (ref 0–2)
BILIRUB SERPL-MCNC: 0.5 MG/DL — SIGNIFICANT CHANGE UP (ref 0.2–1.2)
BILIRUB UR-MCNC: NEGATIVE — SIGNIFICANT CHANGE UP
BUN SERPL-MCNC: 12 MG/DL — SIGNIFICANT CHANGE UP (ref 7–23)
CALCIUM SERPL-MCNC: 9.1 MG/DL — SIGNIFICANT CHANGE UP (ref 8.5–10.1)
CHLORIDE SERPL-SCNC: 106 MMOL/L — SIGNIFICANT CHANGE UP (ref 96–108)
CK MB CFR SERPL CALC: 0.8 NG/ML — SIGNIFICANT CHANGE UP (ref 0.5–3.6)
CO2 SERPL-SCNC: 27 MMOL/L — SIGNIFICANT CHANGE UP (ref 22–31)
COLOR SPEC: YELLOW — SIGNIFICANT CHANGE UP
CREAT SERPL-MCNC: 1.01 MG/DL — SIGNIFICANT CHANGE UP (ref 0.5–1.3)
DIFF PNL FLD: NEGATIVE — SIGNIFICANT CHANGE UP
EOSINOPHIL # BLD AUTO: 0.3 K/UL — SIGNIFICANT CHANGE UP (ref 0–0.5)
EOSINOPHIL NFR BLD AUTO: 8.6 % — HIGH (ref 0–6)
GLUCOSE SERPL-MCNC: 103 MG/DL — HIGH (ref 70–99)
GLUCOSE UR QL: NEGATIVE MG/DL — SIGNIFICANT CHANGE UP
HCT VFR BLD CALC: 41.7 % — SIGNIFICANT CHANGE UP (ref 34.5–45)
HGB BLD-MCNC: 13 G/DL — SIGNIFICANT CHANGE UP (ref 11.5–15.5)
KETONES UR-MCNC: NEGATIVE — SIGNIFICANT CHANGE UP
LEUKOCYTE ESTERASE UR-ACNC: NEGATIVE — SIGNIFICANT CHANGE UP
LIDOCAIN IGE QN: 72 U/L — LOW (ref 73–393)
LYMPHOCYTES # BLD AUTO: 1.6 K/UL — SIGNIFICANT CHANGE UP (ref 1–3.3)
LYMPHOCYTES # BLD AUTO: 39.3 % — SIGNIFICANT CHANGE UP (ref 13–44)
MCHC RBC-ENTMCNC: 27.1 PG — SIGNIFICANT CHANGE UP (ref 27–34)
MCHC RBC-ENTMCNC: 31.2 GM/DL — LOW (ref 32–36)
MCV RBC AUTO: 86.9 FL — SIGNIFICANT CHANGE UP (ref 80–100)
MONOCYTES # BLD AUTO: 0.4 K/UL — SIGNIFICANT CHANGE UP (ref 0–0.9)
MONOCYTES NFR BLD AUTO: 10.1 % — SIGNIFICANT CHANGE UP (ref 2–14)
NEUTROPHILS # BLD AUTO: 1.5 K/UL — LOW (ref 1.8–7.4)
NEUTROPHILS NFR BLD AUTO: 38.7 % — LOW (ref 43–77)
NITRITE UR-MCNC: NEGATIVE — SIGNIFICANT CHANGE UP
NT-PROBNP SERPL-SCNC: 58 PG/ML — SIGNIFICANT CHANGE UP (ref 0–450)
PH UR: 7 — SIGNIFICANT CHANGE UP (ref 5–8)
PLATELET # BLD AUTO: 217 K/UL — SIGNIFICANT CHANGE UP (ref 150–400)
POTASSIUM SERPL-MCNC: 4.4 MMOL/L — SIGNIFICANT CHANGE UP (ref 3.5–5.3)
POTASSIUM SERPL-SCNC: 4.4 MMOL/L — SIGNIFICANT CHANGE UP (ref 3.5–5.3)
PROT SERPL-MCNC: 7.6 GM/DL — SIGNIFICANT CHANGE UP (ref 6–8.3)
PROT UR-MCNC: NEGATIVE MG/DL — SIGNIFICANT CHANGE UP
RBC # BLD: 4.8 M/UL — SIGNIFICANT CHANGE UP (ref 3.8–5.2)
RBC # FLD: 12.7 % — SIGNIFICANT CHANGE UP (ref 11–15)
SODIUM SERPL-SCNC: 141 MMOL/L — SIGNIFICANT CHANGE UP (ref 135–145)
SP GR SPEC: 1.01 — SIGNIFICANT CHANGE UP (ref 1.01–1.02)
TROPONIN I SERPL-MCNC: <.015 NG/ML — SIGNIFICANT CHANGE UP (ref 0.01–0.04)
TROPONIN I SERPL-MCNC: <.015 NG/ML — SIGNIFICANT CHANGE UP (ref 0.01–0.04)
UROBILINOGEN FLD QL: NEGATIVE MG/DL — SIGNIFICANT CHANGE UP
WBC # BLD: 4 K/UL — SIGNIFICANT CHANGE UP (ref 3.8–10.5)
WBC # FLD AUTO: 4 K/UL — SIGNIFICANT CHANGE UP (ref 3.8–10.5)

## 2017-08-14 PROCEDURE — 70450 CT HEAD/BRAIN W/O DYE: CPT | Mod: 26

## 2017-08-14 PROCEDURE — 93010 ELECTROCARDIOGRAM REPORT: CPT

## 2017-08-14 PROCEDURE — 99285 EMERGENCY DEPT VISIT HI MDM: CPT

## 2017-08-14 PROCEDURE — 71010: CPT | Mod: 26

## 2017-08-14 PROCEDURE — 99223 1ST HOSP IP/OBS HIGH 75: CPT

## 2017-08-14 PROCEDURE — 93880 EXTRACRANIAL BILAT STUDY: CPT | Mod: 26

## 2017-08-14 RX ORDER — ASPIRIN/CALCIUM CARB/MAGNESIUM 324 MG
1 TABLET ORAL
Qty: 0 | Refills: 0 | COMMUNITY

## 2017-08-14 RX ORDER — SODIUM CHLORIDE 9 MG/ML
1000 INJECTION INTRAMUSCULAR; INTRAVENOUS; SUBCUTANEOUS
Qty: 0 | Refills: 0 | Status: DISCONTINUED | OUTPATIENT
Start: 2017-08-14 | End: 2017-08-15

## 2017-08-14 RX ORDER — ASPIRIN/CALCIUM CARB/MAGNESIUM 324 MG
81 TABLET ORAL DAILY
Qty: 0 | Refills: 0 | Status: DISCONTINUED | OUTPATIENT
Start: 2017-08-14 | End: 2017-08-15

## 2017-08-14 RX ORDER — MULTIVIT-MIN/FERROUS GLUCONATE 9 MG/15 ML
1 LIQUID (ML) ORAL DAILY
Qty: 0 | Refills: 0 | Status: DISCONTINUED | OUTPATIENT
Start: 2017-08-14 | End: 2017-08-15

## 2017-08-14 RX ORDER — SODIUM CHLORIDE 9 MG/ML
1000 INJECTION INTRAMUSCULAR; INTRAVENOUS; SUBCUTANEOUS ONCE
Qty: 0 | Refills: 0 | Status: COMPLETED | OUTPATIENT
Start: 2017-08-14 | End: 2017-08-14

## 2017-08-14 RX ORDER — MULTIVIT-MIN/FERROUS GLUCONATE 9 MG/15 ML
1 LIQUID (ML) ORAL
Qty: 0 | Refills: 0 | COMMUNITY

## 2017-08-14 RX ADMIN — SODIUM CHLORIDE 333.33 MILLILITER(S): 9 INJECTION INTRAMUSCULAR; INTRAVENOUS; SUBCUTANEOUS at 16:07

## 2017-08-14 NOTE — H&P ADULT - HISTORY OF PRESENT ILLNESS
Pt is 83F, type 2 DM, HTN, HL, prior admit 2015 for syncope w/neg neuro/cardiac workup, hx angioedema to ACEi 6/2017, adm w/syncope. Per report, pt at [facility], sitting around table, when noted to slump over; pt reports no preceding chest pain, dizziness, headache, n/v, abd pain; no hx fevers, sick contacts; EMS activated, and pt brought to ED.     In ED, VSS, FSBG 91; labs wnL, including neg trop x1, CT head neg, CXR neg; pt then adm for eval syncope.

## 2017-08-14 NOTE — H&P ADULT - NSHPPHYSICALEXAM_GEN_ALL_CORE
Vital Signs Last 24 Hrs  T(C): 36.6 (14 Aug 2017 11:47), Max: 36.6 (14 Aug 2017 11:47)  T(F): 97.8 (14 Aug 2017 11:47), Max: 97.8 (14 Aug 2017 11:47)  HR: 62 (14 Aug 2017 11:47) (62 - 62)  BP: 135/79 (14 Aug 2017 11:47) (135/79 - 135/79)  RR: 18  SpO2: 98% (14 Aug 2017 11:47) (98% - 98%)  CAPILLARY BLOOD GLUCOSE  91 (14 Aug 2017 11:51)    GENERAL: elderly female; NAD, well-groomed, well-developed  HEAD:  atraumatic, normocephalic  EYES: sclera anicteric  ENMT: mucous membranes dry  NECK: supple, No JVD  HEART: normal S1, S2  CHEST/LUNG: respirations unlabored; air entry symmetric; clear to auscultation bilaterally; no rales, rhonchi, or wheezing b/l  ABDOMEN: BS+, soft, ND, NT   EXTREMITIES:  pulses palpable; no clubbing, cyanosis, or edema b/l LEs  NEURO: awake, alert, interactive; moves all extremities  VASC: pedal pulses intact  SKIN: no rashes or lesions

## 2017-08-14 NOTE — ED PROVIDER NOTE - OBJECTIVE STATEMENT
Pt is a 84 yo lady with a pmhx of HTN, HL, DM, Alzheimer's who presents to the ED with an episode of syncope. She was sitting down at the [facility] when she lost consciousness. No preceding symptoms, no chest pain, no sob, no lightheadedness or warm sensation. Has not been having any n/v/d, no hx of syncope in the past. Feels back to baseline now. Did not have headstrike.

## 2017-08-14 NOTE — ED ADULT NURSE NOTE - CHIEF COMPLAINT QUOTE
Per EMS pt c/o generalize weakness prior to syncopal episode at [facility] center. Per EMS staff at center denies any head injury. Pt has hx of dementia and HTN.

## 2017-08-14 NOTE — ED ADULT NURSE NOTE - CHPI ED SYMPTOMS NEG
no cough/no fever/no diaphoresis/no shortness of breath/no back pain/no nausea/no chest pain/no vomiting/no chills/no dizziness

## 2017-08-14 NOTE — ED ADULT TRIAGE NOTE - CHIEF COMPLAINT QUOTE
Per EMS pt c/o generalize weakness prior to syncopal episode at [facility] center. Per EMS staff at center denies any head injury. Pt has hx of dementia and HTN.

## 2017-08-14 NOTE — H&P ADULT - ASSESSMENT
83F, type 2 DM, HTN, HL, prior admit 2015 for syncope w/neg neuro/cardiac workup, hx angioedema to ACEi 6/2017, adm w/syncope; undergoing neuro/cardiac workup    NEURO/CV:   *syncope -   -telemetry  monitoring  -serial troponins  -echocardiogram to eval cardiac function  -carotid dopplers  -orthostatic vital signs to exclude volume depletion  -IVFs to address possible volume depletion  -continue daily preventive ASA    *HTN - hemodynamics stable; possible volume depletion as contributory?    -hold Norvasc  -NS bolus, then maintenance for IV hydration    OTHER:  -dvt prophylaxis  -mobilize w/PT eval

## 2017-08-14 NOTE — H&P ADULT - NSHPREVIEWOFSYSTEMS_GEN_ALL_CORE

## 2017-08-14 NOTE — ED ADULT NURSE NOTE - OBJECTIVE STATEMENT
received er bed 11 sent from day program for syncopal episode alert and oriented to person/place unsure of date, baseline memory impairment denies dizziness or headache at present ambulatory with steady gait noted denies weakness at present

## 2017-08-15 VITALS
RESPIRATION RATE: 18 BRPM | SYSTOLIC BLOOD PRESSURE: 141 MMHG | HEART RATE: 74 BPM | DIASTOLIC BLOOD PRESSURE: 77 MMHG | TEMPERATURE: 98 F | OXYGEN SATURATION: 98 %

## 2017-08-15 LAB
CULTURE RESULTS: SIGNIFICANT CHANGE UP
SPECIMEN SOURCE: SIGNIFICANT CHANGE UP
TROPONIN I SERPL-MCNC: <.015 NG/ML — SIGNIFICANT CHANGE UP (ref 0.01–0.04)

## 2017-08-15 PROCEDURE — 99238 HOSP IP/OBS DSCHRG MGMT 30/<: CPT

## 2017-08-15 RX ADMIN — SODIUM CHLORIDE 80 MILLILITER(S): 9 INJECTION INTRAMUSCULAR; INTRAVENOUS; SUBCUTANEOUS at 05:35

## 2017-08-15 RX ADMIN — Medication 1 TABLET(S): at 11:54

## 2017-08-15 RX ADMIN — Medication 81 MILLIGRAM(S): at 11:54

## 2017-08-15 NOTE — DISCHARGE NOTE ADULT - CARE PLAN
Principal Discharge DX:	Syncope, unspecified syncope type  Goal:	resolved  Instructions for follow-up, activity and diet:	Follow up with your doctor  Secondary Diagnosis:	Essential hypertension  Secondary Diagnosis:	Volume depletion

## 2017-08-15 NOTE — DISCHARGE NOTE ADULT - PATIENT PORTAL LINK FT
“You can access the FollowHealth Patient Portal, offered by [facility], by registering with the following website: [email or web address]”

## 2017-08-15 NOTE — DISCHARGE NOTE ADULT - HOSPITAL COURSE
·Pt is a 84 yo lady with a pmhx of HTN, HL, DM, Alzheimer's who presents to the ED with an episode of syncope. She was sitting down at the [facility] when she lost consciousness. No preceding symptoms, no chest pain, no sob, no lightheadedness or warm sensation. Has not been having any n/v/d, no hx of syncope in the past. Feels back to baseline now. Did not have headstrike. patient had a NEGATIVE ct and NEGATIVE carotid doppler and had a NEGATIVE echocardiagram with negative troponins . patient was feelingh better the next and as w/u was NEGATIVE she was discharge

## 2017-08-15 NOTE — DISCHARGE NOTE ADULT - MEDICATION SUMMARY - MEDICATIONS TO TAKE
I will START or STAY ON the medications listed below when I get home from the hospital:    Aspir 81 oral delayed release tablet  -- 1 tab(s) by mouth once a day  -- Indication: For Essential hypertension    amLODIPine 10 mg oral tablet  -- 1 tab(s) by mouth once a day  -- Indication: For Essential hypertension    Multi-Day Plus Minerals oral tablet  -- 1 tab(s) by mouth once a day  -- Indication: For Essential hypertension

## 2017-08-17 DIAGNOSIS — I10 ESSENTIAL (PRIMARY) HYPERTENSION: ICD-10-CM

## 2017-08-17 DIAGNOSIS — R55 SYNCOPE AND COLLAPSE: ICD-10-CM

## 2017-08-17 DIAGNOSIS — E86.1 HYPOVOLEMIA: ICD-10-CM

## 2017-08-17 DIAGNOSIS — Z88.8 ALLERGY STATUS TO OTHER DRUGS, MEDICAMENTS AND BIOLOGICAL SUBSTANCES STATUS: ICD-10-CM

## 2017-08-17 DIAGNOSIS — E11.9 TYPE 2 DIABETES MELLITUS WITHOUT COMPLICATIONS: ICD-10-CM

## 2017-08-17 DIAGNOSIS — Z79.82 LONG TERM (CURRENT) USE OF ASPIRIN: ICD-10-CM

## 2017-08-17 DIAGNOSIS — G30.9 ALZHEIMER'S DISEASE, UNSPECIFIED: ICD-10-CM

## 2017-08-17 DIAGNOSIS — F02.80 DEMENTIA IN OTHER DISEASES CLASSIFIED ELSEWHERE, UNSPECIFIED SEVERITY, WITHOUT BEHAVIORAL DISTURBANCE, PSYCHOTIC DISTURBANCE, MOOD DISTURBANCE, AND ANXIETY: ICD-10-CM

## 2017-08-17 DIAGNOSIS — E78.5 HYPERLIPIDEMIA, UNSPECIFIED: ICD-10-CM

## 2019-08-05 NOTE — ED PROVIDER NOTE - CROS ED RESP ALL NEG
8/5/2019       RE: Lou Chaudhary  [address] [address]     Dear Colleague,    Thank you for referring your patient, Lou Chaudhary, to the [facility] DERMATOLOGY at [facility]. Please see a copy of my visit note below.    [facility] Dermatology Note      Dermatology Problem List:  1. Malignant melanoma, superficial spreading, right dorsal forearm  -Stage IA  -Breslow's 0.42m  -no mites, nonulcerated  -s/p WLE 12/8/2015      2. Basal Cell Carcinoma right post auricular s/p Mohs 12/5/2017     3.  Actinic keratosis  -s/p cryotherapy      4. Mildly dysplastic junctional nevus, lower back  -s/p excision 11/27/2015      5. Psoriasiform dermatitis, right earlobe  -s/p biopsy 1//2016  -No current flare or Tx       Encounter Date: Aug 5, 2019    CC:   Chief Complaint   Patient presents with     Derm Problem     Lou is here for a skin check, has an area of concern behind her right ear and nose.          History of Present Illness:  Ms. Lou Chaudhary is a 66 year old female who presents as a follow-up for skin check. The patient was last seen 12/10/18 when her skin exam was within normal limits. Today the patient has one concern -- a scaly area on her left neck. Today it seems to be resolving, but it felt rough to the touch. Otherwise asymptomatic. No other lesions of concern today. Nothing bleeding, tender or rapidly changing. Does have a brown bump on the left clavicle that gets caught on her clothing, wondering about removing this due to irritation. No fevers, chills, night sweats or unexplained weight loss.    Past Medical History:   Patient Active Problem List   Diagnosis     History of melanoma     Idiopathic scoliosis     Hematochezia     Benign neoplasm of rectum     History of dysplastic nevus     Past Medical History:   Diagnosis Date     Malignant melanoma (H)      Scoliosis      Past Surgical History:   Procedure Laterality Date     BIOPSY OF  SKIN LESION       MOHS MICROGRAPHIC PROCEDURE         Social History:  Patient reports that she has quit smoking. She has never used smokeless tobacco. She reports that she drinks alcohol. She reports that she does not use drugs.    Family History:  Family History   Problem Relation Age of Onset     Diabetes Maternal Grandmother      Asthma Brother      Allergies Son      Skin Cancer Sister         BCC     Hearing Loss No family hx of        Medications:  Current Outpatient Medications   Medication Sig Dispense Refill     acetaminophen (TYLENOL) 325 MG tablet Take 325-650 mg by mouth as needed       IBUPROFEN PO Take 2 tablets by mouth as needed          No Known Allergies      Review of Systems:  -As per HPI  -Constitutional: Otherwise feeling well today, in usual state of health.  -Skin: As above in HPI. No additional skin concerns.    Physical exam:  Vitals: There were no vitals taken for this visit.  GEN: This is a well developed, well-nourished female in no acute distress, in a pleasant mood.    SKIN: Full skin, which includes the head/face, both arms, chest, back, abdomen,both legs, genitalia and/or groin buttocks, digits and/or nails, was examined.  -Right lateral neck with a 1-2mm dome shaped white papule  -1-2mm flesh colored papule on the nasal dorsum  -There is no erythema, telangectasias, nodularity, or pigmentation on the scars of the right forearm and right post auricular region  -Waxy brown papule on the left clavicle  -There are waxy stuck on tan to brown papules on the trunk and extremities.  -There are dome shaped bright red papules on the exam.   -No other lesions of concern on areas examined.   LYMPH: no cervical, supraclavicular, axillary or inguinal LAD    Impression/Plan:  1. History of melanoma R dorsal forearm and Hx of NMSC R postauricular, no clinical evidence of recurrence    ABCDs of melanoma were discussed and self skin checks were advised.    Sun precaution was advised including the  use of sun screens of SPF 30 or higher, sun protective clothing, and avoidance of tanning beds.    2. Inflamed SK, L clavicle    Cryotherapy procedure note: After verbal consent and discussion of risks and benefits including but no limited to dyspigmentation/scar, blister, and pain, 1 was(were) treated with 1-2mm freeze border for 2 cycles with liquid nitrogen. Post cryotherapy instructions were provided.    3. Benign skin findings (fibrous papule-nasal dorsum, milium - R neck, cherry angiomas, SK's)    Reviewed benign etiology, no treatment indicated      CC Referred Self, MD  No address on file on close of this encounter.  Follow-up in 9 months, earlier for new or changing lesions.       Dr. Fierro staffed the patient.    Staff Involved:  Resident(Silvano Campbell)/Staff    Silvano Campbell MD  Dermatology Resident, PGY4      .I was present for the entire procedure. Melinda Fierro MD  .I, Melinda Fierro MD, saw this patient with the resident and agree with the resident s findings and plan of care as documented in the resident s note.    Again, thank you for allowing me to participate in the care of your patient.      Sincerely,    Melinda Fierro MD       negative...

## 2019-09-11 NOTE — PATIENT PROFILE ADULT. - VISION (WITH CORRECTIVE LENSES IF THE PATIENT USUALLY WEARS THEM):
Normal vision: sees adequately in most situations; can see medication labels, newsprint Continue Calcium Acetate 2 tabs w/ each meal

## 2020-03-10 NOTE — DISCHARGE NOTE ADULT - MEDICATION SUMMARY - MEDICATIONS TO STOP TAKING
Type Of Destruction Used: Curettage I will STOP taking the medications listed below when I get home from the hospital:  None

## 2020-08-07 NOTE — PATIENT PROFILE ADULT. - AS SC BRADEN FRICTION
CC:  Columba Carrasco is here today for constipation for several days.    Medications: medications verified and updated  Refills needed today?  NO  Denies known Latex allergy or symptoms of Latex sensitivity.  Patient would like communication of their results via:   Home # [phone].  Okay to leave a message containing results? Yes  Tobacco history: verified    Health Maintenance Due   Topic Date Due   • Shingles Vaccine (2 of 3) 12/31/2011   • Medicare Wellness Visit  06/21/2019     Patient is due for topics as listed above but is not proceeding with them.    [facility] status addressed. Patient Active.          (3) no apparent problem

## 2020-11-12 NOTE — ED PROVIDER NOTE - CROS ED CARDIOVAS ALL NEG
- - - Staging Info: By selecting yes to the question above you will include information on AJCC 8 tumor staging in your Mohs note. Information on tumor staging will be automatically added for SCCs on the head and neck. AJCC 8 includes tumor size, tumor depth, perineural involvement and bone invasion.

## 2021-10-08 NOTE — ED PROVIDER NOTE - CPE EDP CARDIAC NORM
HERNAN Parker M.D.    EGD INSTRUCTIONS - MORNING PROCEDURE      NAME: Ashanti ARCE [facility]   HOSPITAL: [facility] GI LAB    DATE: 11/4/21 Thursday    CHECK IN AT: [address]  4TH FLOOR    ARRIVAL TIME: 7:30 a.m.        - A sedative will be given to you for the exam.  A responsible adult that is 18 years of age or older must accompany you from the hospital the day of your exam.  You may not leave by yourself or drive yourself home.  You may not drive for the rest of the day or return to work.    - IF YOU DO NOT HAVE A  THAT IS A RESPONSIBLE ADULT 18 YEARS OF AGE OR OLDER, YOUR APPOINTMENT WILL BE CANCELLED.    - If you are diabetic, please see special instructions sheet.    If you take blood thinners (Coumadin, Warfarin, Plavix), or if you have had a heart valve replacement, a pacemaker, defibrillator, see special instructions sheet.    PREPARATION:  • You may not have anything to eat or drink after midnight the night before your exam.    • You must take your regular blood pressure and heart medications with a sip of water on the day of your procedure.    • You will be observed for at least one hour following the procedure.         IF YOU HAVE ANY QUESTIONS REGARDING THESE INSTRUCTIONS PLEASE CALL [phone].    
            October 12, 2021       Ashanti Puckett  [address] [facility] [address]      Dear Ashanti      This letter is to confirm your procedure with Dr. Parker on 11/4/21 , Your arrival time is 7:30 a.m.. Please review the enclosed prep instructions for your procedure.    If you have any questions regarding these instructions or need to reschedule please call the office at  [phone].       Sincerely,      Leena       Your procedure will be done at the hospital below.     [x]  [facility], [address], [facility]  [id number]       4th Floor GI Lab. Take elevator left of [employer] shop (south elevator).       Turn right off of elevator on 4th floor. Door in front of you        GI Lab.  Covid test scheduled on 11/1/21  at  7:10 a.m.  At [facility] [address] [facility] .    PLEASE NOTE: Self-quarantine is recommended and minimizes your risk of exposure before procedure.  If you are unable to self-quarantine,  Please continue to wear a mask, practice social distancing and perform good hand hygiene.  
normal...

## 2022-01-21 NOTE — ED ADULT NURSE REASSESSMENT NOTE - NS ED NURSE REASSESS COMMENT FT1
Patient admitted to telemetry by DR. Samuel. Report endorsed to VINICIO Marcelino . warm and dry/color normal detailed exam

## 2022-07-15 NOTE — ED ADULT NURSE NOTE - NS ED NURSE LEVEL OF CONSCIOUSNESS SPEECH
Patient having EGD on 07/22/22 with Dr. Lisette Rubin. Needs Eliquis hold for 48 hours prior.  Fax: [phone] Speaking Coherently

## 2022-10-11 NOTE — PATIENT PROFILE ADULT. - MEDICATIONS BROUGHT TO HOSPITAL, PROFILE
Pt continues to be sleeping at this time.  Vital signs remain stable.  Sp02 is 92-95% while sleeping.  No needs at this time.       no

## 2022-12-22 ENCOUNTER — INPATIENT (INPATIENT)
Facility: HOSPITAL | Age: 88
LOS: 0 days | Discharge: HOME | End: 2022-12-23
Attending: INTERNAL MEDICINE | Admitting: INTERNAL MEDICINE

## 2022-12-22 VITALS
WEIGHT: 125 LBS | OXYGEN SATURATION: 99 % | SYSTOLIC BLOOD PRESSURE: 119 MMHG | RESPIRATION RATE: 18 BRPM | HEIGHT: 64 IN | TEMPERATURE: 100 F | HEART RATE: 88 BPM | DIASTOLIC BLOOD PRESSURE: 75 MMHG

## 2022-12-22 LAB
ALBUMIN SERPL ELPH-MCNC: 3.4 G/DL — SIGNIFICANT CHANGE UP (ref 3.3–5)
ALP SERPL-CCNC: 68 U/L — SIGNIFICANT CHANGE UP (ref 40–120)
ALT FLD-CCNC: 17 U/L — SIGNIFICANT CHANGE UP (ref 12–78)
ANION GAP SERPL CALC-SCNC: 6 MMOL/L — SIGNIFICANT CHANGE UP (ref 5–17)
ANISOCYTOSIS BLD QL: SLIGHT — SIGNIFICANT CHANGE UP
APPEARANCE UR: ABNORMAL
APTT BLD: 29.5 SEC — SIGNIFICANT CHANGE UP (ref 27.5–35.5)
AST SERPL-CCNC: 20 U/L — SIGNIFICANT CHANGE UP (ref 15–37)
BACTERIA # UR AUTO: ABNORMAL
BASOPHILS # BLD AUTO: 0.04 K/UL — SIGNIFICANT CHANGE UP (ref 0–0.2)
BASOPHILS NFR BLD AUTO: 1 % — SIGNIFICANT CHANGE UP (ref 0–2)
BILIRUB SERPL-MCNC: 0.2 MG/DL — SIGNIFICANT CHANGE UP (ref 0.2–1.2)
BILIRUB UR-MCNC: NEGATIVE — SIGNIFICANT CHANGE UP
BUN SERPL-MCNC: 16 MG/DL — SIGNIFICANT CHANGE UP (ref 7–23)
CALCIUM SERPL-MCNC: 9.3 MG/DL — SIGNIFICANT CHANGE UP (ref 8.5–10.1)
CHLORIDE SERPL-SCNC: 102 MMOL/L — SIGNIFICANT CHANGE UP (ref 96–108)
CK SERPL-CCNC: 169 U/L — SIGNIFICANT CHANGE UP (ref 26–192)
CO2 SERPL-SCNC: 27 MMOL/L — SIGNIFICANT CHANGE UP (ref 22–31)
COLOR SPEC: YELLOW — SIGNIFICANT CHANGE UP
CREAT SERPL-MCNC: 0.99 MG/DL — SIGNIFICANT CHANGE UP (ref 0.5–1.3)
DIFF PNL FLD: NEGATIVE — SIGNIFICANT CHANGE UP
EGFR: 55 ML/MIN/1.73M2 — LOW
EOSINOPHIL # BLD AUTO: 0.04 K/UL — SIGNIFICANT CHANGE UP (ref 0–0.5)
EOSINOPHIL NFR BLD AUTO: 1 % — SIGNIFICANT CHANGE UP (ref 0–6)
EPI CELLS # UR: ABNORMAL
GLUCOSE SERPL-MCNC: 91 MG/DL — SIGNIFICANT CHANGE UP (ref 70–99)
GLUCOSE UR QL: NEGATIVE MG/DL — SIGNIFICANT CHANGE UP
HCT VFR BLD CALC: 36.2 % — SIGNIFICANT CHANGE UP (ref 34.5–45)
HGB BLD-MCNC: 11.8 G/DL — SIGNIFICANT CHANGE UP (ref 11.5–15.5)
INR BLD: 1.08 RATIO — SIGNIFICANT CHANGE UP (ref 0.88–1.16)
KETONES UR-MCNC: NEGATIVE — SIGNIFICANT CHANGE UP
LACTATE SERPL-SCNC: 0.9 MMOL/L — SIGNIFICANT CHANGE UP (ref 0.7–2)
LEUKOCYTE ESTERASE UR-ACNC: NEGATIVE — SIGNIFICANT CHANGE UP
LYMPHOCYTES # BLD AUTO: 0.78 K/UL — LOW (ref 1–3.3)
LYMPHOCYTES # BLD AUTO: 21 % — SIGNIFICANT CHANGE UP (ref 13–44)
MANUAL SMEAR VERIFICATION: SIGNIFICANT CHANGE UP
MCHC RBC-ENTMCNC: 27.3 PG — SIGNIFICANT CHANGE UP (ref 27–34)
MCHC RBC-ENTMCNC: 32.6 G/DL — SIGNIFICANT CHANGE UP (ref 32–36)
MCV RBC AUTO: 83.6 FL — SIGNIFICANT CHANGE UP (ref 80–100)
MONOCYTES # BLD AUTO: 0.63 K/UL — SIGNIFICANT CHANGE UP (ref 0–0.9)
MONOCYTES NFR BLD AUTO: 17 % — HIGH (ref 2–14)
NEUTROPHILS # BLD AUTO: 2.22 K/UL — SIGNIFICANT CHANGE UP (ref 1.8–7.4)
NEUTROPHILS NFR BLD AUTO: 59 % — SIGNIFICANT CHANGE UP (ref 43–77)
NEUTS BAND # BLD: 1 % — SIGNIFICANT CHANGE UP (ref 0–8)
NITRITE UR-MCNC: NEGATIVE — SIGNIFICANT CHANGE UP
NRBC # BLD: 0 /100 — SIGNIFICANT CHANGE UP (ref 0–0)
NRBC # BLD: SIGNIFICANT CHANGE UP /100 WBCS (ref 0–0)
PH UR: 6 — SIGNIFICANT CHANGE UP (ref 5–8)
PLAT MORPH BLD: NORMAL — SIGNIFICANT CHANGE UP
PLATELET # BLD AUTO: 179 K/UL — SIGNIFICANT CHANGE UP (ref 150–400)
POTASSIUM SERPL-MCNC: 4.3 MMOL/L — SIGNIFICANT CHANGE UP (ref 3.5–5.3)
POTASSIUM SERPL-SCNC: 4.3 MMOL/L — SIGNIFICANT CHANGE UP (ref 3.5–5.3)
PROT SERPL-MCNC: 7 GM/DL — SIGNIFICANT CHANGE UP (ref 6–8.3)
PROT UR-MCNC: 15 MG/DL
PROTHROM AB SERPL-ACNC: 12.9 SEC — SIGNIFICANT CHANGE UP (ref 10.5–13.4)
RAPID RVP RESULT: DETECTED
RBC # BLD: 4.33 M/UL — SIGNIFICANT CHANGE UP (ref 3.8–5.2)
RBC # FLD: 13.2 % — SIGNIFICANT CHANGE UP (ref 10.3–14.5)
RBC BLD AUTO: ABNORMAL
RBC CASTS # UR COMP ASSIST: SIGNIFICANT CHANGE UP /HPF (ref 0–4)
SARS-COV-2 RNA SPEC QL NAA+PROBE: DETECTED
SODIUM SERPL-SCNC: 135 MMOL/L — SIGNIFICANT CHANGE UP (ref 135–145)
SP GR SPEC: 1.01 — SIGNIFICANT CHANGE UP (ref 1.01–1.02)
TROPONIN I, HIGH SENSITIVITY RESULT: 19.4 NG/L — SIGNIFICANT CHANGE UP
TSH SERPL-MCNC: 0.44 UU/ML — SIGNIFICANT CHANGE UP (ref 0.36–3.74)
UROBILINOGEN FLD QL: NEGATIVE MG/DL — SIGNIFICANT CHANGE UP
WBC # BLD: 3.7 K/UL — LOW (ref 3.8–10.5)
WBC # FLD AUTO: 3.7 K/UL — LOW (ref 3.8–10.5)
WBC UR QL: SIGNIFICANT CHANGE UP

## 2022-12-22 PROCEDURE — 70450 CT HEAD/BRAIN W/O DYE: CPT | Mod: 26,MA

## 2022-12-22 PROCEDURE — 99285 EMERGENCY DEPT VISIT HI MDM: CPT

## 2022-12-22 PROCEDURE — 71045 X-RAY EXAM CHEST 1 VIEW: CPT | Mod: 26

## 2022-12-22 RX ORDER — SODIUM CHLORIDE 9 MG/ML
1000 INJECTION INTRAMUSCULAR; INTRAVENOUS; SUBCUTANEOUS ONCE
Refills: 0 | Status: COMPLETED | OUTPATIENT
Start: 2022-12-22 | End: 2022-12-22

## 2022-12-22 RX ORDER — ACETAMINOPHEN 500 MG
975 TABLET ORAL ONCE
Refills: 0 | Status: COMPLETED | OUTPATIENT
Start: 2022-12-22 | End: 2022-12-22

## 2022-12-22 RX ADMIN — Medication 975 MILLIGRAM(S): at 22:00

## 2022-12-22 RX ADMIN — SODIUM CHLORIDE 1000 MILLILITER(S): 9 INJECTION INTRAMUSCULAR; INTRAVENOUS; SUBCUTANEOUS at 21:30

## 2022-12-22 RX ADMIN — Medication 975 MILLIGRAM(S): at 21:30

## 2022-12-22 NOTE — ED PROVIDER NOTE - CLINICAL SUMMARY MEDICAL DECISION MAKING FREE TEXT BOX
89 yo F with general weakness, no focal deficits, likely UTI, doubt CVA, ACS, anemia unlikely  -basic labs, coags, cpk, ua, cx, ferritin, lactate, procal, rvp, tsh, trop, CT brain, CXr, ekg, iv, hydration bolus, monitor  -f/u results, reeval, tylenol prn

## 2022-12-22 NOTE — ED PROVIDER NOTE - PHYSICAL EXAMINATION
Vitals: WNL  Gen: AAOx3, NAD, laying comfortably in stretcher, daughter at bedside corroborates history   Head: ncat, perrla, eomi b/l  Neck: supple, no lymphadenopathy, no midline deviation  Heart: rrr, no m/r/g  Lungs: CTA b/l, no rales/ronchi/wheezes  Abd: soft, nontender, non-distended, no rebound or guarding  Ext: no clubbing/cyanosis/edema  Neuro: sensation and muscle strength intact b/l, CN2-12 intact b/l, no focal weakness or sensory loss

## 2022-12-22 NOTE — ED ADULT NURSE NOTE - OBJECTIVE STATEMENT
Pt AAOx2, 88 year old female presents to ED with complaint of weakness, lower back pain, and tremors since yesterday. Per daughter at bedside (who patient lives with), yesterday was the first time she noticed tremors and weakness; patient usually walks daily. pt c/o weakness, back pain and tremors with movement since yesterday. Respirations equal and unlabored. No acute distress noted at this time. Denies chest pain, shortness of breath, nausea/vomiting, abdominal pain.      PMH: HTN and dementia

## 2022-12-22 NOTE — ED PROVIDER NOTE - OBJECTIVE STATEMENT
Pt. prefers daughter provide history at bedside:  87 yo F with general weakness, shakiness since yesterday, difficulty getting up from seated position.  Family noticed that over the last couple of days, she's been weaker than usual.  No other focal complaints.  Pt. had a UTI 4 years ago and was weak at that time.  No significant change in mental status per daughter (baseline dementia).    ROS: negative for fever, cough, headache, chest pain, shortness of breath, abd pain, nausea, vomiting, diarrhea, rash, paresthesia, and weakness--all other systems reviewed are negative.   PMH:  alzheimer's dementia, type 2 DM, HTN, HL, prior admit 2015 for syncope w/neg neuro/cardiac workup, hx angioedema to ACEi 6/2017; Meds: See EMR for list; SH: Denies smoking/drinking/drug use

## 2022-12-22 NOTE — ED PROVIDER NOTE - CARE PLAN
1 Principal Discharge DX:	General weakness   Principal Discharge DX:	General weakness  Secondary Diagnosis:	COVID

## 2022-12-23 VITALS
SYSTOLIC BLOOD PRESSURE: 141 MMHG | RESPIRATION RATE: 12 BRPM | HEART RATE: 88 BPM | DIASTOLIC BLOOD PRESSURE: 79 MMHG | OXYGEN SATURATION: 96 %

## 2022-12-23 DIAGNOSIS — R41.82 ALTERED MENTAL STATUS, UNSPECIFIED: ICD-10-CM

## 2022-12-23 DIAGNOSIS — R41.0 DISORIENTATION, UNSPECIFIED: ICD-10-CM

## 2022-12-23 DIAGNOSIS — U07.1 COVID-19: ICD-10-CM

## 2022-12-23 DIAGNOSIS — G93.41 METABOLIC ENCEPHALOPATHY: ICD-10-CM

## 2022-12-23 LAB
CRP SERPL-MCNC: 10 MG/L — HIGH
FERRITIN SERPL-MCNC: 140 NG/ML — SIGNIFICANT CHANGE UP (ref 15–150)
PROCALCITONIN SERPL-MCNC: 0.09 NG/ML — SIGNIFICANT CHANGE UP (ref 0.02–0.1)

## 2022-12-23 PROCEDURE — 99223 1ST HOSP IP/OBS HIGH 75: CPT

## 2022-12-23 PROCEDURE — 99239 HOSP IP/OBS DSCHRG MGMT >30: CPT

## 2022-12-23 PROCEDURE — 93010 ELECTROCARDIOGRAM REPORT: CPT

## 2022-12-23 RX ORDER — LANOLIN ALCOHOL/MO/W.PET/CERES
1 CREAM (GRAM) TOPICAL
Qty: 0 | Refills: 0 | DISCHARGE
Start: 2022-12-23

## 2022-12-23 RX ORDER — ASPIRIN/CALCIUM CARB/MAGNESIUM 324 MG
81 TABLET ORAL DAILY
Refills: 0 | Status: DISCONTINUED | OUTPATIENT
Start: 2022-12-23 | End: 2022-12-23

## 2022-12-23 RX ORDER — AMLODIPINE BESYLATE 2.5 MG/1
10 TABLET ORAL DAILY
Refills: 0 | Status: DISCONTINUED | OUTPATIENT
Start: 2022-12-23 | End: 2022-12-23

## 2022-12-23 RX ORDER — ALBUTEROL 90 UG/1
2 AEROSOL, METERED ORAL
Qty: 1 | Refills: 0
Start: 2022-12-23 | End: 2023-01-21

## 2022-12-23 RX ORDER — ALBUTEROL 90 UG/1
2 AEROSOL, METERED ORAL EVERY 6 HOURS
Refills: 0 | Status: DISCONTINUED | OUTPATIENT
Start: 2022-12-23 | End: 2022-12-23

## 2022-12-23 RX ORDER — ACETAMINOPHEN 500 MG
650 TABLET ORAL EVERY 6 HOURS
Refills: 0 | Status: DISCONTINUED | OUTPATIENT
Start: 2022-12-23 | End: 2022-12-23

## 2022-12-23 RX ORDER — LANOLIN ALCOHOL/MO/W.PET/CERES
3 CREAM (GRAM) TOPICAL AT BEDTIME
Refills: 0 | Status: DISCONTINUED | OUTPATIENT
Start: 2022-12-23 | End: 2022-12-23

## 2022-12-23 RX ORDER — ACETAMINOPHEN 500 MG
2 TABLET ORAL
Qty: 0 | Refills: 0 | DISCHARGE
Start: 2022-12-23

## 2022-12-23 RX ORDER — MULTIVIT-MIN/FERROUS GLUCONATE 9 MG/15 ML
1 LIQUID (ML) ORAL DAILY
Refills: 0 | Status: DISCONTINUED | OUTPATIENT
Start: 2022-12-23 | End: 2022-12-23

## 2022-12-23 RX ADMIN — AMLODIPINE BESYLATE 10 MILLIGRAM(S): 2.5 TABLET ORAL at 05:53

## 2022-12-23 NOTE — DISCHARGE NOTE NURSING/CASE MANAGEMENT/SOCIAL WORK - NSDCPEFALRISK_GEN_ALL_CORE
For information on Fall & Injury Prevention, visit: [email or web address] OR  [email or web address] OR  https://www.cdc.gov/steadi/patient.html

## 2022-12-23 NOTE — DISCHARGE NOTE PROVIDER - CARE PROVIDER_API CALL
Epidural
JOSE E ROMERO  Internal Medicine  [id number] [place] [address]  Phone: [phone]  Fax: ()-  Follow Up Time:

## 2022-12-23 NOTE — DISCHARGE NOTE PROVIDER - HOSPITAL COURSE
Patient poor historian, hx obtained via chart review. Pt alert, but not oriented at all. Calm. History taken from the daughter: 89 yo F with PMHx of Dementia, DM2, HTN, HLD general weakness, shakiness since yesterday, difficulty getting up from seated position.  Family noticed that over the last couple of days, she's been weaker than usual.  No other focal complaints. Concern for ability of patient to complete ADLs. No acute complaints. Pt denies fever, cough, headache, chest pain, shortness of breath, abd pain, nausea, vomiting, diarrhea, rash, paresthesia, and weakness.    Patient was admitted after being found to be COVID positive. This morning patient daughter wants to take her home and set up home care through her primary care doctor. I offered her SW consult including PT while here but they are comfortable taking her home and setting it up on their own.    Patient without acute complaints today. Denies abdominal pain, diarrhea, constipation. tolerating a diet. Less shaking than was noted at home. Denies headache, congestion. Endorses cough.

## 2022-12-23 NOTE — H&P ADULT - NSHPLABSRESULTS_GEN_ALL_CORE
11.8   3.70  )-----------( 179      ( 22 Dec 2022 20:55 )             36.2       12-22    135  |  102  |  16  ----------------------------<  91  4.3   |  27  |  0.99    Ca    9.3      22 Dec 2022 20:55    TPro  7.0  /  Alb  3.4  /  TBili  0.2  /  DBili  x   /  AST  20  /  ALT  17  /  AlkPhos  68  12-22              PT/INR - ( 22 Dec 2022 20:55 )   PT: 12.9 sec;   INR: 1.08 ratio         PTT - ( 22 Dec 2022 20:55 )  PTT:29.5 sec

## 2022-12-23 NOTE — DISCHARGE NOTE NURSING/CASE MANAGEMENT/SOCIAL WORK - PATIENT PORTAL LINK FT
You can access the FollowMyHealth Patient Portal offered by [facility] by registering at the following website: [email or web address]. By joining [employer]’s FollowMyHealth portal, you will also be able to view your health information using other applications (apps) compatible with our system.

## 2022-12-23 NOTE — DISCHARGE NOTE PROVIDER - ATTENDING DISCHARGE PHYSICAL EXAMINATION:
PHYSICAL EXAM:    General: in no acute distress  Eyes: PERRLA, EOMI; conjunctiva and sclera clear  Head: Normocephalic; atraumatic  ENMT: No nasal discharge; airway clear  Neck: Supple; non tender; no masses  Respiratory: No wheezes, rales or rhonchi  Cardiovascular: Regular rate and rhythm. S1 and S2 Normal; No murmurs, gallops or rubs  Gastrointestinal: Soft non-tender non-distended; Normal bowel sounds  Genitourinary: No costovertebral angle tenderness  Extremities: Normal range of motion, No clubbing, cyanosis or edema  Vascular: Peripheral pulses palpable 2+ bilaterally  Neurological: Alert and oriented to place, person, but not situation  Skin: Warm and dry. No acute rash  Lymph Nodes: No acute cervical adenopathy  Psychiatric: Cooperative and appropriate

## 2022-12-23 NOTE — H&P ADULT - ASSESSMENT
#AMS #Acute Metabolic Encephalopathy #Delirium on dementia #COVID-19  - Likely due to COVID-19 infection, no other acute causes identified  - CT Head: negative for acute process  - PT consult  - SW consult for placement as patient is unsafe discharge per daughter  - Tele monitoring  - Fall Risk  - Tylenol and albuterol PRN    #HTN  - Continue home aspirin, amlodipine    VTE: ICPs  Diet: Soft, bite size   #AMS #Acute Metabolic Encephalopathy #Delirium on dementia #COVID-19  - Likely due to COVID-19 infection, no other acute causes identified  - CT Head: negative for acute process  - PT consult  - SW consult for placement as patient is unsafe discharge per daughter  - Tele monitoring  - Fatigue and weakness per family  - Fall Risk  - Tylenol and albuterol PRN    #HTN  - Continue home aspirin, amlodipine    VTE: ICPs  Diet: Soft, bite size

## 2022-12-23 NOTE — ED ADULT NURSE REASSESSMENT NOTE - NS ED NURSE REASSESS COMMENT FT1
Pt AAOx2. Pt sitting comfortably in bed with no complaints at this time. Respirations equal and unlabored. No acute distress noted at this time.
pt vital sign are stable. no new symptom. watching tv in the waiting area. waiting for a bed.

## 2022-12-23 NOTE — DISCHARGE NOTE PROVIDER - NSDCMRMEDTOKEN_GEN_ALL_CORE_FT
acetaminophen 325 mg oral tablet: 2 tab(s) orally every 6 hours, As needed, Temp greater or equal to 38C (100.4F), Mild Pain (1 - 3)  albuterol 90 mcg/inh inhalation aerosol: 2 puff(s) inhaled every 6 hours x 30 days, As Needed -Shortness of Breath   amLODIPine 10 mg oral tablet: 1 tab(s) orally once a day  Aspir 81 oral delayed release tablet: 1 tab(s) orally once a day  guaiFENesin 100 mg/5 mL oral liquid: 5 milliliter(s) orally 4 times a day   melatonin 3 mg oral tablet: 1 tab(s) orally once a day (at bedtime), As needed, Insomnia  Multi-Day Plus Minerals oral tablet: 1 tab(s) orally once a day

## 2022-12-23 NOTE — DISCHARGE NOTE PROVIDER - NSDCCPCAREPLAN_GEN_ALL_CORE_FT
PRINCIPAL DISCHARGE DIAGNOSIS  Diagnosis: General weakness  Assessment and Plan of Treatment: likely due to viral illness  outpatient PT on discharge. Family to arrange      SECONDARY DISCHARGE DIAGNOSES  Diagnosis: COVID  Assessment and Plan of Treatment: at this time will not need inpatient treatments  follow up with PCP  can take robitussin as needed for cough and albuterol inhaler as needed for shortness of breath on discharge  return if increased shortness of breath, cough, fever, diarrhea, abdominal pain, chest pain, leg swelling.

## 2022-12-23 NOTE — H&P ADULT - HISTORY OF PRESENT ILLNESS
Patient poor historian, hx obtained via chart review. Pt alert, but not oriented at all. Calm.    89 yo F with PMHx of Dementia, DM2, HTN, HLD general weakness, shakiness since yesterday, difficulty getting up from seated position.  Family noticed that over the last couple of days, she's been weaker than usual.  No other focal complaints. Concern for ability of patient to complete ADLs. No acute complaints. Pt denies fever, cough, headache, chest pain, shortness of breath, abd pain, nausea, vomiting, diarrhea, rash, paresthesia, and weakness.

## 2022-12-24 LAB
CULTURE RESULTS: SIGNIFICANT CHANGE UP
SPECIMEN SOURCE: SIGNIFICANT CHANGE UP

## 2022-12-28 DIAGNOSIS — Z88.8 ALLERGY STATUS TO OTHER DRUGS, MEDICAMENTS AND BIOLOGICAL SUBSTANCES STATUS: ICD-10-CM

## 2022-12-28 DIAGNOSIS — F02.80 DEMENTIA IN OTHER DISEASES CLASSIFIED ELSEWHERE, UNSPECIFIED SEVERITY, WITHOUT BEHAVIORAL DISTURBANCE, PSYCHOTIC DISTURBANCE, MOOD DISTURBANCE, AND ANXIETY: ICD-10-CM

## 2022-12-28 DIAGNOSIS — E11.9 TYPE 2 DIABETES MELLITUS WITHOUT COMPLICATIONS: ICD-10-CM

## 2022-12-28 DIAGNOSIS — R53.1 WEAKNESS: ICD-10-CM

## 2022-12-28 DIAGNOSIS — I10 ESSENTIAL (PRIMARY) HYPERTENSION: ICD-10-CM

## 2022-12-28 DIAGNOSIS — U07.1 COVID-19: ICD-10-CM

## 2022-12-28 DIAGNOSIS — G30.9 ALZHEIMER'S DISEASE, UNSPECIFIED: ICD-10-CM

## 2022-12-28 DIAGNOSIS — E78.5 HYPERLIPIDEMIA, UNSPECIFIED: ICD-10-CM

## 2023-02-21 NOTE — H&P ADULT - NSHPSOURCEINFORD_GEN_ALL_CORE
[facility] Hospital Medicine Progress Note        Chief Complaint: Inpatient Follow-up for     HPI:   Hospital course reviewed    Interval Hx:   Seen ambulating in the hallway with no difficulty.  PT signed off.  When seen at bedside patient was alert, comfortably resting and having breakfast.  Daughter was at bedside.  No new complaints or concerns except for intermittent confusion during the night.  Sitter was continued overnight.        Objective/physical exam:  Vitals:    02/20/23 1916 02/20/23 2300 02/21/23 0300 02/21/23 0700   BP: 123/71 116/67 (!) 108/58 (!) 141/80   BP Location:    Left arm   Patient Position:    Lying   Pulse: 74 60 62 65   Resp: 18 18 18 18   Temp: 98.6 °F (37 °C) 98.6 °F (37 °C) 97.7 °F (36.5 °C) 98.8 °F (37.1 °C)   TempSrc: Oral Oral Oral Oral   SpO2: 95% 97% 96% 96%   Weight:       Height:         General: In no acute distress, afebrile  Respiratory: Clear to auscultation bilaterally  Cardiovascular: S1, S2, no appreciable murmur  Abdomen: Soft, nontender, BS +  MSK: Warm, no lower extremity edema, no clubbing or cyanosis  Neurologic:  A/O x3, intact strength in all extremities.      Lab Results   Component Value Date     02/18/2023    K 3.9 02/18/2023    CO2 22 (L) 02/18/2023    BUN 22.5 02/18/2023    CREATININE 1.39 (H) 02/18/2023    CALCIUM 8.7 (L) 02/18/2023    EGFRNONAA 46 07/31/2021      Lab Results   Component Value Date    ALT 93 (H) 02/17/2023    AST 86 (H) 02/17/2023    ALKPHOS 64 02/17/2023    BILITOT 0.4 02/17/2023      Lab Results   Component Value Date    WBC 4.9 02/18/2023    HGB 11.4 (L) 02/18/2023    HCT 33.1 (L) 02/18/2023    MCV 93.8 02/18/2023     02/18/2023           Medications:   acetaminophen  1,000 mg Oral TID    enoxaparin  40 mg Subcutaneous Daily    finasteride  5 mg Oral Daily    hydrOXYzine pamoate  25 mg Oral Daily with lunch    melatonin  6 mg Oral Nightly    memantine  10 mg Oral BID    multivitamin  1 tablet  Chart(s)/Patient Oral Daily    QUEtiapine  75 mg Oral Q24H    senna-docusate 8.6-50 mg  2 tablet Oral QHS    sertraline  50 mg Oral Daily    tamsulosin  1 capsule Oral BID      acetaminophen, albuterol, aluminum-magnesium hydroxide-simethicone, benzonatate, haloperidol lactate, hydrALAZINE, morphine, ondansetron, polyethylene glycol, prochlorperazine, senna-docusate 8.6-50 mg, simethicone, sodium chloride 0.9%, traMADoL     Assessment/Plan:    Recurrent falls  Bilateral hip pain/osteoarthritis   L1 compression fracture  Acute urinary retention needing mead-discontinued  Post renal Acute kidney injury on CKD   COVID-19 infection  ( vaccinated)  Acute encephalopathy/delirium on dementia - improving      Plan:   -psychiatry adjusted regimen.  Signed off.  Appreciate assistance.  Patient's family requesting for snf placement.  -will discontinue sitter .  Continue fall precautions   -Mead discontinued.  Continue Flomax and finasteride.  Needs outpatient urology follow-up   -stable from COVID-19 standpoint.  DC'ed isolation.  -continue therapy.  Other home medications were reviewed.  Continue memantine   -PT    Lovenox           Gadiel Rome MD

## 2025-02-24 ENCOUNTER — EMERGENCY (EMERGENCY)
Facility: HOSPITAL | Age: 89
LOS: 0 days | Discharge: HOME | End: 2025-02-24
Payer: MEDICARE

## 2025-02-24 VITALS
HEART RATE: 95 BPM | SYSTOLIC BLOOD PRESSURE: 166 MMHG | DIASTOLIC BLOOD PRESSURE: 91 MMHG | RESPIRATION RATE: 17 BRPM | OXYGEN SATURATION: 95 % | TEMPERATURE: 98 F

## 2025-02-24 VITALS
DIASTOLIC BLOOD PRESSURE: 90 MMHG | HEIGHT: 65 IN | RESPIRATION RATE: 18 BRPM | SYSTOLIC BLOOD PRESSURE: 177 MMHG | TEMPERATURE: 98 F | HEART RATE: 93 BPM | OXYGEN SATURATION: 100 % | WEIGHT: 119.93 LBS

## 2025-02-24 DIAGNOSIS — E11.9 TYPE 2 DIABETES MELLITUS WITHOUT COMPLICATIONS: ICD-10-CM

## 2025-02-24 DIAGNOSIS — G30.9 ALZHEIMER'S DISEASE, UNSPECIFIED: ICD-10-CM

## 2025-02-24 DIAGNOSIS — F02.80 DEMENTIA IN OTHER DISEASES CLASSIFIED ELSEWHERE, UNSPECIFIED SEVERITY, WITHOUT BEHAVIORAL DISTURBANCE, PSYCHOTIC DISTURBANCE, MOOD DISTURBANCE, AND ANXIETY: ICD-10-CM

## 2025-02-24 DIAGNOSIS — U07.1 COVID-19: ICD-10-CM

## 2025-02-24 DIAGNOSIS — I10 ESSENTIAL (PRIMARY) HYPERTENSION: ICD-10-CM
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
## 2025-02-24 DIAGNOSIS — R53.1 WEAKNESS: ICD-10-CM

## 2025-02-24 DIAGNOSIS — S90.02XA CONTUSION OF LEFT ANKLE, INITIAL ENCOUNTER: ICD-10-CM

## 2025-02-24 DIAGNOSIS — Z88.8 ALLERGY STATUS TO OTHER DRUGS, MEDICAMENTS AND BIOLOGICAL SUBSTANCES: ICD-10-CM

## 2025-02-24 DIAGNOSIS — R82.71 BACTERIURIA: ICD-10-CM

## 2025-02-24 DIAGNOSIS — E78.5 HYPERLIPIDEMIA, UNSPECIFIED: ICD-10-CM

## 2025-02-24 LAB
ALBUMIN SERPL ELPH-MCNC: 3.8 G/DL — SIGNIFICANT CHANGE UP (ref 3.3–5)
ALP SERPL-CCNC: 63 U/L — SIGNIFICANT CHANGE UP (ref 40–120)
ALT FLD-CCNC: 27 U/L — SIGNIFICANT CHANGE UP (ref 12–78)
ANION GAP SERPL CALC-SCNC: 4 MMOL/L — LOW (ref 5–17)
APPEARANCE UR: CLEAR — SIGNIFICANT CHANGE UP
AST SERPL-CCNC: 83 U/L — HIGH (ref 15–37)
BACTERIA # UR AUTO: ABNORMAL /HPF
BASOPHILS # BLD AUTO: 0.01 K/UL — SIGNIFICANT CHANGE UP (ref 0–0.2)
BASOPHILS NFR BLD AUTO: 0.2 % — SIGNIFICANT CHANGE UP (ref 0–2)
BILIRUB SERPL-MCNC: 0.4 MG/DL — SIGNIFICANT CHANGE UP (ref 0.2–1.2)
BILIRUB UR-MCNC: NEGATIVE — SIGNIFICANT CHANGE UP
BUN SERPL-MCNC: 14 MG/DL — SIGNIFICANT CHANGE UP (ref 7–23)
CALCIUM SERPL-MCNC: 9.5 MG/DL — SIGNIFICANT CHANGE UP (ref 8.5–10.1)
CHLORIDE SERPL-SCNC: 103 MMOL/L — SIGNIFICANT CHANGE UP (ref 96–108)
CO2 SERPL-SCNC: 27 MMOL/L — SIGNIFICANT CHANGE UP (ref 22–31)
COLOR SPEC: YELLOW — SIGNIFICANT CHANGE UP
CREAT SERPL-MCNC: 0.95 MG/DL — SIGNIFICANT CHANGE UP (ref 0.5–1.3)
DIFF PNL FLD: ABNORMAL
EGFR: 57 ML/MIN/1.73M2 — LOW
EOSINOPHIL # BLD AUTO: 0 K/UL — SIGNIFICANT CHANGE UP (ref 0–0.5)
EOSINOPHIL NFR BLD AUTO: 0 % — SIGNIFICANT CHANGE UP (ref 0–6)
EPI CELLS # UR: PRESENT
FLUAV AG NPH QL: SIGNIFICANT CHANGE UP
FLUBV AG NPH QL: SIGNIFICANT CHANGE UP
GLUCOSE SERPL-MCNC: 134 MG/DL — HIGH (ref 70–99)
GLUCOSE UR QL: 100 MG/DL
HCT VFR BLD CALC: 40.9 % — SIGNIFICANT CHANGE UP (ref 34.5–45)
HGB BLD-MCNC: 13.5 G/DL — SIGNIFICANT CHANGE UP (ref 11.5–15.5)
IMM GRANULOCYTES NFR BLD AUTO: 0.5 % — SIGNIFICANT CHANGE UP (ref 0–0.9)
KETONES UR-MCNC: ABNORMAL MG/DL
LEUKOCYTE ESTERASE UR-ACNC: NEGATIVE — SIGNIFICANT CHANGE UP
LYMPHOCYTES # BLD AUTO: 0.71 K/UL — LOW (ref 1–3.3)
LYMPHOCYTES # BLD AUTO: 10.7 % — LOW (ref 13–44)
MAGNESIUM SERPL-MCNC: 2.4 MG/DL — SIGNIFICANT CHANGE UP (ref 1.6–2.6)
MCHC RBC-ENTMCNC: 27.2 PG — SIGNIFICANT CHANGE UP (ref 27–34)
MCHC RBC-ENTMCNC: 33 G/DL — SIGNIFICANT CHANGE UP (ref 32–36)
MCV RBC AUTO: 82.3 FL — SIGNIFICANT CHANGE UP (ref 80–100)
MONOCYTES # BLD AUTO: 0.61 K/UL — SIGNIFICANT CHANGE UP (ref 0–0.9)
MONOCYTES NFR BLD AUTO: 9.2 % — SIGNIFICANT CHANGE UP (ref 2–14)
NEUTROPHILS # BLD AUTO: 5.25 K/UL — SIGNIFICANT CHANGE UP (ref 1.8–7.4)
NEUTROPHILS NFR BLD AUTO: 79.4 % — HIGH (ref 43–77)
NITRITE UR-MCNC: NEGATIVE — SIGNIFICANT CHANGE UP
NRBC BLD AUTO-RTO: 0 /100 WBCS — SIGNIFICANT CHANGE UP (ref 0–0)
PH UR: 6.5 — SIGNIFICANT CHANGE UP (ref 5–8)
PLATELET # BLD AUTO: 168 K/UL — SIGNIFICANT CHANGE UP (ref 150–400)
POTASSIUM SERPL-MCNC: 5 MMOL/L — SIGNIFICANT CHANGE UP (ref 3.5–5.3)
POTASSIUM SERPL-SCNC: 5 MMOL/L — SIGNIFICANT CHANGE UP (ref 3.5–5.3)
PROT SERPL-MCNC: 7.7 GM/DL — SIGNIFICANT CHANGE UP (ref 6–8.3)
PROT UR-MCNC: 100 MG/DL
RBC # BLD: 4.97 M/UL — SIGNIFICANT CHANGE UP (ref 3.8–5.2)
RBC # FLD: 13.3 % — SIGNIFICANT CHANGE UP (ref 10.3–14.5)
RBC CASTS # UR COMP ASSIST: 4 /HPF — SIGNIFICANT CHANGE UP (ref 0–4)
RSV RNA NPH QL NAA+NON-PROBE: SIGNIFICANT CHANGE UP
SARS-COV-2 RNA SPEC QL NAA+PROBE: DETECTED
SODIUM SERPL-SCNC: 134 MMOL/L — LOW (ref 135–145)
SP GR SPEC: 1.02 — SIGNIFICANT CHANGE UP (ref 1–1.03)
UROBILINOGEN FLD QL: 0.2 MG/DL — SIGNIFICANT CHANGE UP (ref 0.2–1)
WBC # BLD: 6.61 K/UL — SIGNIFICANT CHANGE UP (ref 3.8–10.5)
WBC # FLD AUTO: 6.61 K/UL — SIGNIFICANT CHANGE UP (ref 3.8–10.5)
WBC UR QL: 2 /HPF — SIGNIFICANT CHANGE UP (ref 0–5)

## 2025-02-24 PROCEDURE — 70450 CT HEAD/BRAIN W/O DYE: CPT | Mod: 26

## 2025-02-24 PROCEDURE — 93010 ELECTROCARDIOGRAM REPORT: CPT

## 2025-02-24 PROCEDURE — 73610 X-RAY EXAM OF ANKLE: CPT | Mod: 26,LT

## 2025-02-24 PROCEDURE — 99285 EMERGENCY DEPT VISIT HI MDM: CPT

## 2025-02-24 PROCEDURE — 72170 X-RAY EXAM OF PELVIS: CPT | Mod: 26

## 2025-02-24 PROCEDURE — 71045 X-RAY EXAM CHEST 1 VIEW: CPT | Mod: 26

## 2025-02-24 RX ORDER — BACTERIOSTATIC SODIUM CHLORIDE 0.9 %
500 VIAL (ML) INJECTION ONCE
Refills: 0 | Status: COMPLETED | OUTPATIENT
Start: 2025-02-24 | End: 2025-02-24

## 2025-02-24 RX ADMIN — Medication 1000 MILLILITER(S): at 15:45

## 2025-02-24 NOTE — ED ADULT TRIAGE NOTE - CHIEF COMPLAINT QUOTE
Patient BIB EMS s/p unwitnessed fall, found on floor by HHA and daughter at 8AM, last seen at 6AM.   Patient AO X 1 as per reported baseline as per HHA. No deformitys noted.   PMH: Alzheimer's Dementia, HTN

## 2025-02-24 NOTE — ED PROVIDER NOTE - NSFOLLOWUPINSTRUCTIONS_ED_ALL_ED_FT
Fall Prevention in the Home  ImageFalls can cause injuries. They can happen to people of all ages. There are many things you can do to make your home safe and to help prevent falls.    What can I do on the outside of my home?  Regularly fix the edges of walkways and driveways and fix any cracks.  Remove anything that might make you trip as you walk through a door, such as a raised step or threshold.  Trim any bushes or trees on the path to your home.  Use bright outdoor lighting.  Clear any walking paths of anything that might make someone trip, such as rocks or tools.  Regularly check to see if handrails are loose or broken. Make sure that both sides of any steps have handrails.  Any raised decks and porches should have guardrails on the edges.  Have any leaves, snow, or ice cleared regularly.  Use sand or salt on walking paths during winter.  Clean up any spills in your garage right away. This includes oil or grease spills.  What can I do in the bathroom?  Use night lights.  Install grab bars by the toilet and in the tub and shower. Do not use towel bars as grab bars.  Use non-skid mats or decals in the tub or shower.  If you need to sit down in the shower, use a plastic, non-slip stool.  Keep the floor dry. Clean up any water that spills on the floor as soon as it happens.  Remove soap buildup in the tub or shower regularly.  Attach bath mats securely with double-sided non-slip rug tape.  Do not have throw rugs and other things on the floor that can make you trip.  What can I do in the bedroom?  Use night lights.  Make sure that you have a light by your bed that is easy to reach.  Do not use any sheets or blankets that are too big for your bed. They should not hang down onto the floor.  Have a firm chair that has side arms. You can use this for support while you get dressed.  Do not have throw rugs and other things on the floor that can make you trip.  What can I do in the kitchen?  Clean up any spills right away.  Avoid walking on wet floors.  Keep items that you use a lot in easy-to-reach places.  If you need to reach something above you, use a strong step stool that has a grab bar.  Keep electrical cords out of the way.  Do not use floor polish or wax that makes floors slippery. If you must use wax, use non-skid floor wax.  Do not have throw rugs and other things on the floor that can make you trip.  What can I do with my stairs?  Do not leave any items on the stairs.  Make sure that there are handrails on both sides of the stairs and use them. Fix handrails that are broken or loose. Make sure that handrails are as long as the stairways.  Check any carpeting to make sure that it is firmly attached to the stairs. Fix any carpet that is loose or worn.  Avoid having throw rugs at the top or bottom of the stairs. If you do have throw rugs, attach them to the floor with carpet tape.  Make sure that you have a light switch at the top of the stairs and the bottom of the stairs. If you do not have them, ask someone to add them for you.  What else can I do to help prevent falls?  Wear shoes that:    Do not have high heels.  Have rubber bottoms.  Are comfortable and fit you well.  Are closed at the toe. Do not wear sandals.    If you use a stepladder:    Make sure that it is fully opened. Do not climb a closed stepladder.  Make sure that both sides of the stepladder are locked into place.  Ask someone to hold it for you, if possible.    Clearly randy and make sure that you can see:    Any grab bars or handrails.  First and last steps.  Where the edge of each step is.    Use tools that help you move around (mobility aids) if they are needed. These include:    Canes.  Walkers.  Scooters.  Crutches.    Turn on the lights when you go into a dark area. Replace any light bulbs as soon as they burn out.  Set up your furniture so you have a clear path. Avoid moving your furniture around.  If any of your floors are uneven, fix them.  If there are any pets around you, be aware of where they are.  Review your medicines with your doctor. Some medicines can make you feel dizzy. This can increase your chance of falling.  Ask your doctor what other things that you can do to help prevent falls.    This information is not intended to replace advice given to you by your health care provider. Make sure you discuss any questions you have with your health care provider.

## 2025-02-24 NOTE — ED ADULT NURSE NOTE - OBJECTIVE STATEMENT
Pt  received alert, periods of confusion, vs as charted, S/P fall at home, unsure of headstrike or LOC. C/o mild pain to left ankle. Ambulatory at baseline. Pt now awaiting imaging, Safety maintained.

## 2025-02-24 NOTE — ED ADULT NURSE NOTE - NSFALLRISKINTERV_ED_ALL_ED

## 2025-02-24 NOTE — ED PROVIDER NOTE - PATIENT PORTAL LINK FT
You can access the FollowMyHealth Patient Portal offered by [facility] by registering at the following website: [email or web address]. By joining [employer]’s FollowMyHealth portal, you will also be able to view your health information using other applications (apps) compatible with our system.

## 2025-02-24 NOTE — ED PROVIDER NOTE - PHYSICAL EXAMINATION
PHYSICAL EXAM:    GENERAL: Alert, appears stated age, well appearing, non-toxic  SKIN: Warm, and dry.   HEAD: NC, AT, no step offs   EYE: Normal lids/conjunctiva, PERRL, EOMI  ENT: Normal hearing, patent oropharynx   NECK: +supple. No meningismus, or JVD, +Trachea midline. no tenderness/step offs.   Pulm: Bilateral BS, normal resp effort, no wheezes, stridor, or retractions  CV: RRR, no M/R/G, 2+and = radial pulses  Abd: soft, non-tender, non-distended. no rebound/guarding   Mskel: no erythema, cyanosis, edema. no calf tenderness. from to b/l ue/le with each joint isolated. +L lateral malleolus mild ecchymosis. 2+ dp pulses. no spinal ttp/ecchymosis.   Neuro: AAOx1, no sensory/motor deficits, No speech slurring, pronator drift, facial asymmetry. normal finger-to-nose b/l. 5/5 strength throughout.

## 2025-04-11 ENCOUNTER — RESULT CHARGE (OUTPATIENT)
Age: 89
End: 2025-04-11

## 2025-04-11 ENCOUNTER — APPOINTMENT (OUTPATIENT)
Dept: ORTHOPEDIC SURGERY | Facility: CLINIC | Age: 89
End: 2025-04-11
Payer: MEDICARE

## 2025-04-11 VITALS — HEIGHT: 67 IN | WEIGHT: 125 LBS | BODY MASS INDEX: 19.62 KG/M2

## 2025-04-11 DIAGNOSIS — Z78.9 OTHER SPECIFIED HEALTH STATUS: ICD-10-CM

## 2025-04-11 DIAGNOSIS — L97.321 NON-PRESSURE CHRONIC ULCER OF LEFT ANKLE LIMITED TO BREAKDOWN OF SKIN: ICD-10-CM

## 2025-04-11 PROBLEM — Z00.00 ENCOUNTER FOR PREVENTIVE HEALTH EXAMINATION: Status: ACTIVE | Noted: 2025-04-11

## 2025-04-11 PROCEDURE — 99203 OFFICE O/P NEW LOW 30 MIN: CPT

## 2025-04-11 PROCEDURE — 73610 X-RAY EXAM OF ANKLE: CPT | Mod: LT

## 2025-05-22 ENCOUNTER — NON-APPOINTMENT (OUTPATIENT)
Age: 89
End: 2025-05-22

## 2025-05-23 ENCOUNTER — APPOINTMENT (OUTPATIENT)
Dept: VASCULAR SURGERY | Facility: CLINIC | Age: 89
End: 2025-05-23
Payer: MEDICARE

## 2025-05-23 VITALS
HEIGHT: 67 IN | DIASTOLIC BLOOD PRESSURE: 82 MMHG | BODY MASS INDEX: 19.62 KG/M2 | OXYGEN SATURATION: 96 % | HEART RATE: 87 BPM | SYSTOLIC BLOOD PRESSURE: 135 MMHG | WEIGHT: 125 LBS | TEMPERATURE: 98.2 F

## 2025-05-23 DIAGNOSIS — M79.605 PAIN IN LEFT LEG: ICD-10-CM

## 2025-05-23 DIAGNOSIS — I87.2 VENOUS INSUFFICIENCY (CHRONIC) (PERIPHERAL): ICD-10-CM

## 2025-05-23 DIAGNOSIS — I83.899 VARICOSE VEINS OF UNSPECIFIED LOWER EXTREMITY WITH OTHER COMPLICATIONS: ICD-10-CM

## 2025-05-23 DIAGNOSIS — I73.9 PERIPHERAL VASCULAR DISEASE, UNSPECIFIED: ICD-10-CM

## 2025-05-23 DIAGNOSIS — R60.9 EDEMA, UNSPECIFIED: ICD-10-CM

## 2025-05-23 PROCEDURE — 99205 OFFICE O/P NEW HI 60 MIN: CPT

## 2025-06-20 ENCOUNTER — APPOINTMENT (OUTPATIENT)
Dept: VASCULAR SURGERY | Facility: CLINIC | Age: 89
End: 2025-06-20
Payer: MEDICARE

## 2025-06-20 VITALS
HEART RATE: 78 BPM | HEIGHT: 67 IN | RESPIRATION RATE: 16 BRPM | WEIGHT: 125 LBS | DIASTOLIC BLOOD PRESSURE: 69 MMHG | OXYGEN SATURATION: 96 % | SYSTOLIC BLOOD PRESSURE: 121 MMHG | BODY MASS INDEX: 19.62 KG/M2

## 2025-06-20 PROCEDURE — 93970 EXTREMITY STUDY: CPT

## 2025-06-20 PROCEDURE — 99213 OFFICE O/P EST LOW 20 MIN: CPT

## 2025-06-20 PROCEDURE — 93923 UPR/LXTR ART STDY 3+ LVLS: CPT
